# Patient Record
Sex: FEMALE | Race: WHITE | NOT HISPANIC OR LATINO | Employment: OTHER | URBAN - METROPOLITAN AREA
[De-identification: names, ages, dates, MRNs, and addresses within clinical notes are randomized per-mention and may not be internally consistent; named-entity substitution may affect disease eponyms.]

---

## 2017-03-08 ENCOUNTER — OFFICE VISIT (OUTPATIENT)
Dept: MEDICAL GROUP | Facility: PHYSICIAN GROUP | Age: 82
End: 2017-03-08
Payer: COMMERCIAL

## 2017-03-08 VITALS
SYSTOLIC BLOOD PRESSURE: 140 MMHG | HEART RATE: 60 BPM | HEIGHT: 65 IN | OXYGEN SATURATION: 95 % | DIASTOLIC BLOOD PRESSURE: 80 MMHG | TEMPERATURE: 98.2 F | BODY MASS INDEX: 21.83 KG/M2 | RESPIRATION RATE: 16 BRPM | WEIGHT: 131 LBS

## 2017-03-08 DIAGNOSIS — L08.9 INFECTED SEBACEOUS CYST: ICD-10-CM

## 2017-03-08 DIAGNOSIS — I10 ESSENTIAL HYPERTENSION: ICD-10-CM

## 2017-03-08 DIAGNOSIS — L72.3 INFECTED SEBACEOUS CYST: ICD-10-CM

## 2017-03-08 DIAGNOSIS — F41.9 ANXIETY: ICD-10-CM

## 2017-03-08 PROCEDURE — 99214 OFFICE O/P EST MOD 30 MIN: CPT | Performed by: FAMILY MEDICINE

## 2017-03-08 RX ORDER — ALPRAZOLAM 0.25 MG/1
0.25 TABLET ORAL NIGHTLY PRN
Qty: 30 TAB | Refills: 0 | Status: SHIPPED | OUTPATIENT
Start: 2017-03-08 | End: 2019-05-06

## 2017-03-08 RX ORDER — DOXYCYCLINE HYCLATE 100 MG
100 TABLET ORAL 2 TIMES DAILY
Qty: 20 TAB | Refills: 0 | Status: SHIPPED | OUTPATIENT
Start: 2017-03-08 | End: 2019-05-06

## 2017-03-08 ASSESSMENT — ENCOUNTER SYMPTOMS
MYALGIAS: 0
EYES NEGATIVE: 1
HEADACHES: 0
MUSCULOSKELETAL NEGATIVE: 1
DIZZINESS: 0
HEMOPTYSIS: 0
COUGH: 0
CONSTITUTIONAL NEGATIVE: 1
PALPITATIONS: 0
PSYCHIATRIC NEGATIVE: 1
FEVER: 0
NECK PAIN: 0
CONSTIPATION: 0
CHILLS: 0
GASTROINTESTINAL NEGATIVE: 1
RESPIRATORY NEGATIVE: 1
NEUROLOGICAL NEGATIVE: 1
CARDIOVASCULAR NEGATIVE: 1

## 2017-03-08 NOTE — MR AVS SNAPSHOT
"Tamy Calvin   3/8/2017 9:45 AM   Office Visit   MRN: [id number]    Department[facility]  [facility]    Dept Phone:  [phone]    Description:  Female : 1935   Provider:  Baltazar Hopkins M.D.           Reason for Visit     Breast Mass           Allergies as of 3/8/2017     Allergen Noted Reactions    Pcn [Penicillins] 2012         You were diagnosed with     Infected sebaceous cyst   [350191]       Essential hypertension   [9324096]       Anxiety   [252751]         Vital Signs     Blood Pressure Pulse Temperature Respirations Height Weight    140/80 mmHg 60 36.8 °C (98.2 °F) 16 1.651 m (5' 5\") 59.421 kg (131 lb)    Body Mass Index Oxygen Saturation Smoking Status             21.80 kg/m2 95% Never Smoker          Basic Information     Date Of Birth Sex Race Ethnicity Preferred Language    1935 Female White Non- English      Problem List              ICD-10-CM Priority Class Noted - Resolved    Hypertension I10   2012 - Present    Preventative health care Z00.00   2012 - Present    Ischemic heart disease I25.9   Unknown - Present    Multiple thyroid nodules E04.2   Unknown - Present    Osteoporosis M81.0   2012 - Present    Aortic stenosis I35.0   2012 - Present    Seasonal allergies J30.2   2013 - Present    Right foot pain M79.671   2014 - Present    Colon polyps K63.5   2015 - Present      Health Maintenance        Date Due Completion Dates    IMM DTaP/Tdap/Td Vaccine (1 - Tdap) 1954 ---    PAP SMEAR 1956 ---    IMM ZOSTER VACCINE 1995 ---    IMM PNEUMOCOCCAL 65+ (ADULT) LOW/MEDIUM RISK SERIES (1 of 2 - PCV13) 2000 ---    IMM INFLUENZA (1) 2016 ---    BONE DENSITY 7/3/2018 7/3/2013    COLONOSCOPY 2018, 2015, 2014            Current Immunizations     No immunizations on file.      Below and/or attached are the medications your provider expects you to take. Review all of your home medications " and newly ordered medications with your provider and/or pharmacist. Follow medication instructions as directed by your provider and/or pharmacist. Please keep your medication list with you and share with your provider. Update the information when medications are discontinued, doses are changed, or new medications (including over-the-counter products) are added; and carry medication information at all times in the event of emergency situations     Allergies:  PCN - (reactions not documented)               Medications  Valid as of: March 08, 2017 - 10:10 AM    Generic Name Brand Name Tablet Size Instructions for use    ALPRAZolam (Tab) XANAX 0.25 MG Take 1 Tab by mouth at bedtime as needed. May need  For anxiety        AmLODIPine Besylate (Tab) NORVASC 2.5 MG TAKE ONE TABLET BY MOUTH ONCE DAILY        Aspirin (Tablet Delayed Response) ECOTRIN 81 MG Take 81 mg by mouth every day.        Carvedilol (Tab) COREG 6.25 MG TAKE ONE TABLET BY MOUTH TWICE DAILY WITH MEALS        Carvedilol (Tab) COREG 6.25 MG TAKE ONE TABLET BY MOUTH TWICE DAILY WITH MEALS        Citalopram Hydrobromide (Tab) CELEXA 20 MG Take 1 Tab by mouth every day.        Diclofenac Sodium (Gel) Diclofenac Sodium 1 % Apply 1 Application to skin as directed 3 times a day.        Doxycycline Hyclate (Tab) VIBRAMYCIN 100 MG Take 1 Tab by mouth 2 times a day.        Lisinopril-Hydrochlorothiazide (Tab) PRINZIDE, ZESTORETIC 20-12.5 MG Take 1 Tab by mouth every day.        Loratadine (Chew Tab) Loratadine 5 MG Take  by mouth.        Meloxicam (Tab) MOBIC 7.5 MG Take 1 Tab by mouth every day.        MethylPREDNISolone (Tablet Therapy Pack) MEDROL DOSEPAK 4 MG As dir        .                 Medicines prescribed today were sent to:     [facility] [address] [place] [address] (S), [place] [id number] [facility] (S) [place] [address]    Phone: [phone] Fax: [phone]    Open 24 Hours?: No      Medication refill instructions:       If your prescription bottle  indicates you have medication refills left, it is not necessary to call your provider’s office. Please contact your pharmacy and they will refill your medication.    If your prescription bottle indicates you do not have any refills left, you may request refills at any time through one of the following ways: The online [employer] system (except Urgent Care), by calling your provider’s office, or by asking your pharmacy to contact your provider’s office with a refill request. Medication refills are processed only during regular business hours and may not be available until the next business day. Your provider may request additional information or to have a follow-up visit with you prior to refilling your medication.   *Please Note: Medication refills are assigned a new Rx number when refilled electronically. Your pharmacy may indicate that no refills were authorized even though a new prescription for the same medication is available at the pharmacy. Please request the medicine by name with the pharmacy before contacting your provider for a refill.           [employer] Status: Patient Declined

## 2017-03-08 NOTE — PROGRESS NOTES
Subjective:      Tamy Calvin is a 81 y.o. female who presents with Breast Mass            HPI Comments: Had a left upper chest mass for many years - got inflamed and then burst and still is a bit red    1. Infected sebaceous cyst    - doxycycline (VIBRAMYCIN) 100 MG Tab; Take 1 Tab by mouth 2 times a day.  Dispense: 20 Tab; Refill: 0    Past Medical History:    Hypertension                                                  Ischemic heart disease                                        Arrhythmia                                                      Comment:unknown type.  pt is on sotalol    Osteoporosis                                                  Multiple thyroid nodules                                        Comment:reportedly had thyroid us in [place] 4/12 that                was stable.     Cataract                                                      Heart murmur                                                  Bilateral cataracts                             6/25/2012   Past Surgical History:    ABDOMINAL HYSTERECTOMY TOTAL                                   Smoking Status: Never Smoker                      Smokeless Status: Never Used                        Alcohol Use: No              Review of patient's family history indicates:    Cancer                         Daughter                    Comment: breast cancer     Psychiatry                     Daughter                  Genetic                        Maternal Grandmother        Current outpatient prescriptions: •  doxycycline (VIBRAMYCIN) 100 MG Tab, Take 1 Tab by mouth 2 times a day., Disp: 20 Tab, Rfl: 0•  carvedilol (COREG) 6.25 MG Tab, TAKE ONE TABLET BY MOUTH TWICE DAILY WITH MEALS, Disp: 60 Tab, Rfl: 11•  amlodipine (NORVASC) 2.5 MG Tab, TAKE ONE TABLET BY MOUTH ONCE DAILY, Disp: 90 Tab, Rfl: 3•  meloxicam (MOBIC) 7.5 MG Tab, Take 1 Tab by mouth every day., Disp: 30 Tab, Rfl: 3•  MethylPREDNISolone (MEDROL DOSEPAK) 4 MG Tablet Therapy Pack,  As dir, Disp: 1 Kit, Rfl: 0•  citalopram (CELEXA) 20 MG Tab, Take 1 Tab by mouth every day., Disp: 30 Tab, Rfl: 3•  lisinopril-hydrochlorothiazide (PRINZIDE, ZESTORETIC) 20-12.5 MG per tablet, Take 1 Tab by mouth every day., Disp: 90 Tab, Rfl: 3•  carvedilol (COREG) 6.25 MG Tab, TAKE ONE TABLET BY MOUTH TWICE DAILY WITH MEALS, Disp: 180 Tab, Rfl: 1•  Loratadine (CLARITIN) 5 MG Chew Tab, Take  by mouth., Disp: , Rfl: •  alprazolam (XANAX) 0.25 MG Tab, Take 1 Tab by mouth at bedtime as needed for Sleep. May need  For anxiety (Patient not taking: Reported on 10/31/2016), Disp: 30 Tab, Rfl: 0•  Diclofenac Sodium 1 % GEL, Apply 1 Application to skin as directed 3 times a day., Disp: 45 g, Rfl: 1•  aspirin EC (ECOTRIN) 81 MG TBEC, Take 81 mg by mouth every day., Disp: , Rfl:      Assessment/plan: diagnoses listed as above in problem list. Patient will continue with current medications/diet/lifestyle modifications    Patient will continue with current medication regimen, advised on taking meds every day, f/u in 3 mo.          Rash  This is a new problem. The current episode started more than 1 year ago. The affected locations include the chest. Pertinent negatives include no cough or fever.       Review of Systems   Constitutional: Negative.  Negative for fever and chills.        Past Medical History:    Hypertension                                                  Ischemic heart disease                                        Arrhythmia                                                      Comment:unknown type.  pt is on sotalol    Osteoporosis                                                  Multiple thyroid nodules                                        Comment:reportedly had thyroid us in [place] 4/12 that                was stable.     Cataract                                                      Heart murmur                                                  Bilateral cataracts                             6/25/2012   Past  "Surgical History:    ABDOMINAL HYSTERECTOMY TOTAL                                   Smoking Status: Never Smoker                      Smokeless Status: Never Used                        Alcohol Use: No              Review of patient's family history indicates:    Cancer                         Daughter                    Comment: breast cancer     Psychiatry                     Daughter                  Genetic                        Maternal Grandmother       HENT: Negative.    Eyes: Negative.    Respiratory: Negative.  Negative for cough and hemoptysis.    Cardiovascular: Negative.  Negative for chest pain and palpitations.   Gastrointestinal: Negative.  Negative for constipation.   Genitourinary: Negative.  Negative for dysuria and urgency.   Musculoskeletal: Negative.  Negative for myalgias and neck pain.   Skin: Positive for rash.   Neurological: Negative.  Negative for dizziness and headaches.   Endo/Heme/Allergies: Negative.    Psychiatric/Behavioral: Negative.  Negative for suicidal ideas.          Objective:     /80 mmHg  Pulse 60  Temp(Src) 36.8 °C (98.2 °F)  Resp 16  Ht 1.651 m (5' 5\")  Wt 59.421 kg (131 lb)  BMI 21.80 kg/m2  SpO2 95%     Physical Exam   Constitutional: She is oriented to person, place, and time. No distress.   HENT:   Head: Normocephalic and atraumatic.   Right Ear: External ear normal.   Left Ear: External ear normal.   Nose: Nose normal.   Mouth/Throat: Oropharynx is clear and moist. No oropharyngeal exudate.   Eyes: Pupils are equal, round, and reactive to light. Right eye exhibits no discharge. Left eye exhibits no discharge. No scleral icterus.   Neck: Normal range of motion. Neck supple. No JVD present. No tracheal deviation present. No thyromegaly present.   Cardiovascular: Normal rate, regular rhythm, normal heart sounds and intact distal pulses.  Exam reveals no gallop and no friction rub.    No murmur heard.  Pulmonary/Chest: Effort normal and breath sounds normal. " No stridor. No respiratory distress. She has no wheezes. She has no rales. She exhibits no tenderness.   Abdominal: Soft. She exhibits no distension. There is no tenderness.   Lymphadenopathy:     She has no cervical adenopathy.   Neurological: She is alert and oriented to person, place, and time.   Skin: Skin is warm and dry. She is not diaphoretic.   Infected sebaceous cyst on left upper chest - will treat with abx and then f/u in 2 weeks for removal   Psychiatric: Judgment normal.   Nursing note and vitals reviewed.              Assessment/Plan:     1. Infected sebaceous cyst    - doxycycline (VIBRAMYCIN) 100 MG Tab; Take 1 Tab by mouth 2 times a day.  Dispense: 20 Tab; Refill: 0

## 2017-03-22 ENCOUNTER — OFFICE VISIT (OUTPATIENT)
Dept: MEDICAL GROUP | Facility: PHYSICIAN GROUP | Age: 82
End: 2017-03-22
Payer: COMMERCIAL

## 2017-03-22 VITALS
SYSTOLIC BLOOD PRESSURE: 110 MMHG | BODY MASS INDEX: 21.83 KG/M2 | HEIGHT: 65 IN | WEIGHT: 131 LBS | RESPIRATION RATE: 16 BRPM | OXYGEN SATURATION: 95 % | TEMPERATURE: 96.8 F | DIASTOLIC BLOOD PRESSURE: 62 MMHG | HEART RATE: 62 BPM

## 2017-03-22 DIAGNOSIS — L72.3 SEBACEOUS CYST: ICD-10-CM

## 2017-03-22 DIAGNOSIS — Z98.890 H/O REMOVAL OF CYST: ICD-10-CM

## 2017-03-22 PROCEDURE — 11401 EXC TR-EXT B9+MARG 0.6-1 CM: CPT | Performed by: FAMILY MEDICINE

## 2017-03-22 NOTE — PROGRESS NOTES
After consent obtained, area prepped with iodine and then anesth with 1% lidocaine  #15 blade used to make 7 mm incision and then 10 by 5 mm sebaceous cyst removed along with sac pieces. Irrigated and closed with one 4-0 nylon suture , bandage applied and advised on wound care and f/u in one week for suture removal    1. H/O removal of cyst    - Consent for Surgery / Procedure    2. Sebaceous cyst      Past Medical History   Diagnosis Date   • Hypertension    • Ischemic heart disease    • Arrhythmia      unknown type.  pt is on sotalol   • Osteoporosis    • Multiple thyroid nodules      reportedly had thyroid us in [place] 4/12 that was stable.    • Cataract    • Heart murmur    • Bilateral cataracts 6/25/2012     Past Surgical History   Procedure Laterality Date   • Abdominal hysterectomy total       Social History   Substance Use Topics   • Smoking status: Never Smoker    • Smokeless tobacco: Never Used   • Alcohol Use: No     Family History   Problem Relation Age of Onset   • Cancer Daughter      breast cancer    • Psychiatry Daughter    • Genetic Maternal Grandmother          Current outpatient prescriptions:   •  doxycycline (VIBRAMYCIN) 100 MG Tab, Take 1 Tab by mouth 2 times a day., Disp: 20 Tab, Rfl: 0  •  alprazolam (XANAX) 0.25 MG Tab, Take 1 Tab by mouth at bedtime as needed. May need  For anxiety, Disp: 30 Tab, Rfl: 0  •  carvedilol (COREG) 6.25 MG Tab, TAKE ONE TABLET BY MOUTH TWICE DAILY WITH MEALS, Disp: 60 Tab, Rfl: 11  •  amlodipine (NORVASC) 2.5 MG Tab, TAKE ONE TABLET BY MOUTH ONCE DAILY, Disp: 90 Tab, Rfl: 3  •  meloxicam (MOBIC) 7.5 MG Tab, Take 1 Tab by mouth every day., Disp: 30 Tab, Rfl: 3  •  MethylPREDNISolone (MEDROL DOSEPAK) 4 MG Tablet Therapy Pack, As dir, Disp: 1 Kit, Rfl: 0  •  citalopram (CELEXA) 20 MG Tab, Take 1 Tab by mouth every day., Disp: 30 Tab, Rfl: 3  •  lisinopril-hydrochlorothiazide (PRINZIDE, ZESTORETIC) 20-12.5 MG per tablet, Take 1 Tab by mouth every day., Disp: 90 Tab,  Rfl: 3  •  carvedilol (COREG) 6.25 MG Tab, TAKE ONE TABLET BY MOUTH TWICE DAILY WITH MEALS, Disp: 180 Tab, Rfl: 1  •  Loratadine (CLARITIN) 5 MG Chew Tab, Take  by mouth., Disp: , Rfl:   •  Diclofenac Sodium 1 % GEL, Apply 1 Application to skin as directed 3 times a day., Disp: 45 g, Rfl: 1  •  aspirin EC (ECOTRIN) 81 MG TBEC, Take 81 mg by mouth every day., Disp: , Rfl:     Assessment/plan: diagnoses listed as above in problem list. Patient will continue with current medications/diet/lifestyle modifications    Patient will continue with current medication regimen, advised on taking meds every day, f/u in 3 mo.

## 2017-03-22 NOTE — TELEPHONE ENCOUNTER
Was the patient seen in the last year in this department? Yes     Does patient have an active prescription for medications requested? No     Received Request Via: Pharmacy   Last seen 3/22/17  Last labs 11/18/16

## 2017-03-23 RX ORDER — MELOXICAM 7.5 MG/1
TABLET ORAL
Qty: 30 TAB | Refills: 2 | Status: SHIPPED | OUTPATIENT
Start: 2017-03-23 | End: 2017-07-05 | Stop reason: SDUPTHER

## 2017-04-06 ENCOUNTER — OFFICE VISIT (OUTPATIENT)
Dept: MEDICAL GROUP | Facility: PHYSICIAN GROUP | Age: 82
End: 2017-04-06
Payer: COMMERCIAL

## 2017-04-06 VITALS
RESPIRATION RATE: 16 BRPM | HEIGHT: 65 IN | HEART RATE: 66 BPM | WEIGHT: 131 LBS | TEMPERATURE: 98.3 F | OXYGEN SATURATION: 95 % | BODY MASS INDEX: 21.83 KG/M2 | DIASTOLIC BLOOD PRESSURE: 80 MMHG | SYSTOLIC BLOOD PRESSURE: 148 MMHG

## 2017-04-06 DIAGNOSIS — L72.3 SEBACEOUS CYST: ICD-10-CM

## 2017-04-06 NOTE — MR AVS SNAPSHOT
"        Liliachnadan[facility] Espinalmira   2017 1:15 PM   Office Visit   MRN: [id number]    [facility]    Dept Phone:  [phone]    Description:  Female : 1935   Provider:  Baltazar Hopkins M.D.           Reason for Visit     Suture / Staple Removal           Allergies as of 2017     Allergen Noted Reactions    Pcn [Penicillins] 2012         You were diagnosed with     Sebaceous cyst   [706.2.ICD-9-CM]         Vital Signs     Blood Pressure Pulse Temperature Respirations Height Weight    148/80 mmHg 66 36.8 °C (98.3 °F) 16 1.651 m (5' 5\") 59.421 kg (131 lb)    Body Mass Index Oxygen Saturation Smoking Status             21.80 kg/m2 95% Never Smoker          Basic Information     Date Of Birth Sex Race Ethnicity Preferred Language    1935 Female White Non- English      Problem List              ICD-10-CM Priority Class Noted - Resolved    Hypertension I10   2012 - Present    Preventative health care Z00.00   2012 - Present    Ischemic heart disease I25.9   Unknown - Present    Multiple thyroid nodules E04.2   Unknown - Present    Osteoporosis M81.0   2012 - Present    Aortic stenosis I35.0   2012 - Present    Seasonal allergies J30.2   2013 - Present    Right foot pain M79.671   2014 - Present    Colon polyps K63.5   2015 - Present      Health Maintenance        Date Due Completion Dates    IMM DTaP/Tdap/Td Vaccine (1 - Tdap) 1954 ---    PAP SMEAR 1956 ---    IMM ZOSTER VACCINE 1995 ---    IMM PNEUMOCOCCAL 65+ (ADULT) LOW/MEDIUM RISK SERIES (1 of 2 - PCV13) 2000 ---    BONE DENSITY 7/3/2018 7/3/2013    COLONOSCOPY 2018, 2015, 2014            Current Immunizations     No immunizations on file.      Below and/or attached are the medications your provider expects you to take. Review all of your home medications and newly ordered medications with your provider and/or pharmacist. Follow medication " instructions as directed by your provider and/or pharmacist. Please keep your medication list with you and share with your provider. Update the information when medications are discontinued, doses are changed, or new medications (including over-the-counter products) are added; and carry medication information at all times in the event of emergency situations     Allergies:  PCN - (reactions not documented)               Medications  Valid as of: April 06, 2017 -  3:55 PM    Generic Name Brand Name Tablet Size Instructions for use    ALPRAZolam (Tab) XANAX 0.25 MG Take 1 Tab by mouth at bedtime as needed. May need  For anxiety        AmLODIPine Besylate (Tab) NORVASC 2.5 MG TAKE ONE TABLET BY MOUTH ONCE DAILY        Aspirin (Tablet Delayed Response) ECOTRIN 81 MG Take 81 mg by mouth every day.        Carvedilol (Tab) COREG 6.25 MG TAKE ONE TABLET BY MOUTH TWICE DAILY WITH MEALS        Carvedilol (Tab) COREG 6.25 MG TAKE ONE TABLET BY MOUTH TWICE DAILY WITH MEALS        Citalopram Hydrobromide (Tab) CELEXA 20 MG Take 1 Tab by mouth every day.        Diclofenac Sodium (Gel) Diclofenac Sodium 1 % Apply 1 Application to skin as directed 3 times a day.        Doxycycline Hyclate (Tab) VIBRAMYCIN 100 MG Take 1 Tab by mouth 2 times a day.        Lisinopril-Hydrochlorothiazide (Tab) PRINZIDE, ZESTORETIC 20-12.5 MG Take 1 Tab by mouth every day.        Loratadine (Chew Tab) Loratadine 5 MG Take  by mouth.        Meloxicam (Tab) MOBIC 7.5 MG TAKE ONE TABLET BY MOUTH ONCE DAILY        MethylPREDNISolone (Tablet Therapy Pack) MEDROL DOSEPAK 4 MG As dir        .                 Medicines prescribed today were sent to:     [facility] [place] ([place]), [place] - [address] [id number] [facility] [place] ([place]) [place] [address]    Phone: [phone] Fax: [phone]    Open 24 Hours?: No      Medication refill instructions:       If your prescription bottle indicates you have medication refills left, it is not necessary to call your  provider’s office. Please contact your pharmacy and they will refill your medication.    If your prescription bottle indicates you do not have any refills left, you may request refills at any time through one of the following ways: The online [employer] system (except Urgent Care), by calling your provider’s office, or by asking your pharmacy to contact your provider’s office with a refill request. Medication refills are processed only during regular business hours and may not be available until the next business day. Your provider may request additional information or to have a follow-up visit with you prior to refilling your medication.   *Please Note: Medication refills are assigned a new Rx number when refilled electronically. Your pharmacy may indicate that no refills were authorized even though a new prescription for the same medication is available at the pharmacy. Please request the medicine by name with the pharmacy before contacting your provider for a refill.           MyChart Status: Patient Declined

## 2017-06-05 ENCOUNTER — OFFICE VISIT (OUTPATIENT)
Dept: MEDICAL GROUP | Facility: LAB | Age: 82
End: 2017-06-05
Payer: COMMERCIAL

## 2017-06-05 VITALS
WEIGHT: 127 LBS | SYSTOLIC BLOOD PRESSURE: 126 MMHG | RESPIRATION RATE: 12 BRPM | HEART RATE: 56 BPM | OXYGEN SATURATION: 96 % | HEIGHT: 65 IN | BODY MASS INDEX: 21.16 KG/M2 | DIASTOLIC BLOOD PRESSURE: 84 MMHG | TEMPERATURE: 99.1 F

## 2017-06-05 DIAGNOSIS — R21 RASH AND NONSPECIFIC SKIN ERUPTION: ICD-10-CM

## 2017-06-05 DIAGNOSIS — I83.93 VARICOSE VEINS OF LEGS: ICD-10-CM

## 2017-06-05 DIAGNOSIS — J30.2 SEASONAL ALLERGIC RHINITIS, UNSPECIFIED ALLERGIC RHINITIS TRIGGER: ICD-10-CM

## 2017-06-05 PROCEDURE — 99214 OFFICE O/P EST MOD 30 MIN: CPT | Performed by: FAMILY MEDICINE

## 2017-06-05 ASSESSMENT — ENCOUNTER SYMPTOMS
SHORTNESS OF BREATH: 0
FEVER: 0
RHINORRHEA: 1

## 2017-06-05 NOTE — MR AVS SNAPSHOT
"        Liliayolandaalexander Espinalmira   2017 9:20 AM   Office Visit   MRN: [id number]    Department:  [facility]   Dept Phone:  [phone]    Description:  Female : 1935   Provider:  Damaris Faustin M.D.           Reason for Visit     Rash on both legs x 2-3 weeks, very itchy      Allergies as of 2017     Allergen Noted Reactions    Pcn [Penicillins] 2012         You were diagnosed with     Seasonal allergic rhinitis, unspecified allergic rhinitis trigger   [9490468]       Rash and nonspecific skin eruption   [[id number]]       Varicose veins of legs   [[id number]]         Vital Signs     Blood Pressure Pulse Temperature Respirations Height Weight    126/84 mmHg 56 37.3 °C (99.1 °F) 12 1.651 m (5' 5\") 57.607 kg (127 lb)    Body Mass Index Oxygen Saturation Smoking Status             21.13 kg/m2 96% Never Smoker          Basic Information     Date Of Birth Sex Race Ethnicity Preferred Language    1935 Female White Non- English      Problem List              ICD-10-CM Priority Class Noted - Resolved    Hypertension I10   2012 - Present    Preventative health care Z00.00   2012 - Present    Ischemic heart disease I25.9   Unknown - Present    Multiple thyroid nodules E04.2   Unknown - Present    Osteoporosis M81.0   2012 - Present    Aortic stenosis I35.0   2012 - Present    Seasonal allergies J30.2   2013 - Present    Right foot pain M79.671   2014 - Present    Colon polyps K63.5   2015 - Present      Health Maintenance        Date Due Completion Dates    IMM DTaP/Tdap/Td Vaccine (1 - Tdap) 1954 ---    PAP SMEAR 1956 ---    IMM ZOSTER VACCINE 1995 ---    IMM PNEUMOCOCCAL 65+ (ADULT) LOW/MEDIUM RISK SERIES (1 of 2 - PCV13) 2000 ---    BONE DENSITY 7/3/2018 7/3/2013    COLONOSCOPY 2018, 2015, 2014            Current Immunizations     No immunizations on file.      Below and/or attached are the medications your " provider expects you to take. Review all of your home medications and newly ordered medications with your provider and/or pharmacist. Follow medication instructions as directed by your provider and/or pharmacist. Please keep your medication list with you and share with your provider. Update the information when medications are discontinued, doses are changed, or new medications (including over-the-counter products) are added; and carry medication information at all times in the event of emergency situations     Allergies:  PCN - (reactions not documented)               Medications  Valid as of: June 05, 2017 - 10:02 AM    Generic Name Brand Name Tablet Size Instructions for use    ALPRAZolam (Tab) XANAX 0.25 MG Take 1 Tab by mouth at bedtime as needed. May need  For anxiety        AmLODIPine Besylate (Tab) NORVASC 2.5 MG TAKE ONE TABLET BY MOUTH ONCE DAILY        Aspirin (Tablet Delayed Response) ECOTRIN 81 MG Take 81 mg by mouth every day.        Carvedilol (Tab) COREG 6.25 MG TAKE ONE TABLET BY MOUTH TWICE DAILY WITH MEALS        Carvedilol (Tab) COREG 6.25 MG TAKE ONE TABLET BY MOUTH TWICE DAILY WITH MEALS        Citalopram Hydrobromide (Tab) CELEXA 20 MG Take 1 Tab by mouth every day.        Diclofenac Sodium (Gel) Diclofenac Sodium 1 % Apply 1 Application to skin as directed 3 times a day.        Doxycycline Hyclate (Tab) VIBRAMYCIN 100 MG Take 1 Tab by mouth 2 times a day.        Hydrocortisone (Cream) hydrocortisone 2.5 % Apply sparingly to affected area bid        Lisinopril-Hydrochlorothiazide (Tab) PRINZIDE, ZESTORETIC 20-12.5 MG Take 1 Tab by mouth every day.        Loratadine (Chew Tab) Loratadine 5 MG Take  by mouth.        Meloxicam (Tab) MOBIC 7.5 MG TAKE ONE TABLET BY MOUTH ONCE DAILY        MethylPREDNISolone (Tablet Therapy Pack) MEDROL DOSEPAK 4 MG As dir        .                 Medicines prescribed today were sent to:     [facility] [address] [place] - [address] [address] [facility]  [place] [id number]    Phone: [phone] Fax: [phone]    Open 24 Hours?: No      Medication refill instructions:       If your prescription bottle indicates you have medication refills left, it is not necessary to call your provider’s office. Please contact your pharmacy and they will refill your medication.    If your prescription bottle indicates you do not have any refills left, you may request refills at any time through one of the following ways: The online [employer] [place] system (except Urgent Care), by calling your provider’s office, or by asking your pharmacy to contact your provider’s office with a refill request. Medication refills are processed only during regular business hours and may not be available until the next business day. Your provider may request additional information or to have a follow-up visit with you prior to refilling your medication.   *Please Note: Medication refills are assigned a new Rx number when refilled electronically. Your pharmacy may indicate that no refills were authorized even though a new prescription for the same medication is available at the pharmacy. Please request the medicine by name with the pharmacy before contacting your provider for a refill.        Referral     A referral request has been sent to our patient care coordination department. Please allow 3-5 business days for us to process this request and contact you either by phone or mail. If you do not hear from us by the 5th business day, please call us at [phone].           [employer] [place] Access Code: [email or web address]  Expires: 7/5/2017 10:02 AM    [employer] [place]  A secure, online tool to manage your health information     [employer]’s [employer] [place]® is a secure, online tool that connects you to your personalized health information from the privacy of your home -- day or night - making it very easy for you to manage your healthcare. Once the activation process is completed, you can even access your medical information  using the [facility] andrew, which is available for free in the Apple Andrew store or Google Play store.     [facility] provides the following levels of access (as shown below):   My Chart Features   [facility] Primary [facility] Doctor [facility]  Specialists [facility]  Urgent  Care Non-[facility]  Primary Care  Doctor   Email your healthcare team securely and privately 24/7 X X X    Manage appointments: schedule your next appointment; view details of past/upcoming appointments X      Request prescription refills. X      View recent personal medical records, including lab and immunizations X X X X   View health record, including health history, allergies, medications X X X X   Read reports about your outpatient visits, procedures, consult and ER notes X X X X   See your discharge summary, which is a recap of your hospital and/or ER visit that includes your diagnosis, lab results, and care plan. X X       How to register for [facility]:  1. Go to  [email or web address].  2. Click on the Sign Up Now box, which takes you to the New Member Sign Up page. You will need to provide the following information:  a. Enter your [facility] Access Code exactly as it appears at the top of this page. (You will not need to use this code after you’ve completed the sign-up process. If you do not sign up before the expiration date, you must request a new code.)   b. Enter your date of birth.   c. Enter your home email address.   d. Click Submit, and follow the next screen’s instructions.  3. Create a [facility] ID. This will be your [facility] login ID and cannot be changed, so think of one that is secure and easy to remember.  4. Create a [facility] password. You can change your password at any time.  5. Enter your Password Reset Question and Answer. This can be used at a later time if you forget your password.   6. Enter your e-mail address. This allows you to receive e-mail notifications when new information is available in [facility].  7. Click Sign Up. You can now view your  health information.    For assistance activating your [employer] account, call [phone]

## 2017-06-05 NOTE — PROGRESS NOTES
Subjective:      Tamy Calvin is a 81 y.o. female who presents with Rash    Chief Complaint   Patient presents with   • Rash     on both legs x 2-3 weeks, very itchy     Daughter interpreting for patient         Rash  This is a new problem. The problem has been gradually improving since onset. The affected locations include the left lower leg and right lowerleg (both anterior legs ). The rash is characterized by dryness and itchiness. She was exposed to nothing. Associated symptoms include rhinorrhea. Pertinent negatives include no fever or shortness of breath. Past treatments include antihistamine and anti-itch cream (topical and oral antihistamine). The treatment provided mild relief. Her past medical history is significant for allergies.     Rash on legs. Patient has had this on and off now for about 5 years and has been able to manage this with hydrocortisone OTC cream and benadryl cream. Has been doing this lately and not helping. She has had a rash now for 2 weeks. Daughter reports that the area is red and itchy. Taking allegra daily and was taking Claritin before that. For the past couple of days she has been taking Benadryl and this seems to help a little bit with the rash as the rash is not as red. Topical aloe vera also seems to be helping. Has allergies and has sneexing, runny nose. They are not sure what the trigger is. Patient thinks she is reacting to the weather but daughter thinks this is some allergy. Took allegra last this morning. Rash is only on the anterior lower legs and she can tell that she wears socks as the rash does not go down onto her feet. No rash located on her posterior lower legs. She does go for walks and has been wearing long pants for the most part but sometimes will wear joan's.     Patient Active Problem List    Diagnosis Date Noted   • Colon polyps 01/29/2015   • Right foot pain 08/25/2014   • Seasonal allergies 06/11/2013   • Aortic stenosis 08/07/2012   •  Hypertension 06/25/2012   • Preventative health care 06/25/2012   • Osteoporosis 06/25/2012   • Ischemic heart disease    • Multiple thyroid nodules      Family History   Problem Relation Age of Onset   • Cancer Daughter      breast cancer    • Psychiatry Daughter    • Genetic Maternal Grandmother      Social History     Social History   • Marital Status:      Spouse Name: N/A   • Number of Children: N/A   • Years of Education: N/A     Occupational History   • Not on file.     Social History Main Topics   • Smoking status: Never Smoker    • Smokeless tobacco: Never Used   • Alcohol Use: No   • Drug Use: No   • Sexual Activity: Not Currently     Other Topics Concern   • Not on file     Social History Narrative       Current outpatient prescriptions:   •  meloxicam (MOBIC) 7.5 MG Tab, TAKE ONE TABLET BY MOUTH ONCE DAILY, Disp: 30 Tab, Rfl: 2  •  doxycycline (VIBRAMYCIN) 100 MG Tab, Take 1 Tab by mouth 2 times a day., Disp: 20 Tab, Rfl: 0  •  alprazolam (XANAX) 0.25 MG Tab, Take 1 Tab by mouth at bedtime as needed. May need  For anxiety, Disp: 30 Tab, Rfl: 0  •  carvedilol (COREG) 6.25 MG Tab, TAKE ONE TABLET BY MOUTH TWICE DAILY WITH MEALS, Disp: 60 Tab, Rfl: 11  •  amlodipine (NORVASC) 2.5 MG Tab, TAKE ONE TABLET BY MOUTH ONCE DAILY, Disp: 90 Tab, Rfl: 3  •  MethylPREDNISolone (MEDROL DOSEPAK) 4 MG Tablet Therapy Pack, As dir, Disp: 1 Kit, Rfl: 0  •  citalopram (CELEXA) 20 MG Tab, Take 1 Tab by mouth every day., Disp: 30 Tab, Rfl: 3  •  lisinopril-hydrochlorothiazide (PRINZIDE, ZESTORETIC) 20-12.5 MG per tablet, Take 1 Tab by mouth every day., Disp: 90 Tab, Rfl: 3  •  carvedilol (COREG) 6.25 MG Tab, TAKE ONE TABLET BY MOUTH TWICE DAILY WITH MEALS, Disp: 180 Tab, Rfl: 1  •  Loratadine (CLARITIN) 5 MG Chew Tab, Take  by mouth., Disp: , Rfl:   •  Diclofenac Sodium 1 % GEL, Apply 1 Application to skin as directed 3 times a day., Disp: 45 g, Rfl: 1  •  aspirin EC (ECOTRIN) 81 MG TBEC, Take 81 mg by mouth every  "day., Disp: , Rfl:       Review of Systems   Constitutional: Negative for fever.   HENT: Positive for rhinorrhea.    Respiratory: Negative for shortness of breath.    Skin: Positive for rash.          Objective:     /84 mmHg  Pulse 56  Temp(Src) 37.3 °C (99.1 °F)  Resp 12  Ht 1.651 m (5' 5\")  Wt 57.607 kg (127 lb)  BMI 21.13 kg/m2  SpO2 96%     Physical Exam   Constitutional: She appears well-developed and well-nourished. She is active and cooperative.  Non-toxic appearance. She does not have a sickly appearance. No distress.   HENT:   Head: Normocephalic and atraumatic.   Eyes: Conjunctivae and EOM are normal.   Neurological: She is alert. She is not disoriented. She displays no tremor. She displays no seizure activity. Gait normal.   Skin: Skin is warm and dry. She is not diaphoretic.        Area of rash. No excoriation noted . Varicosities noted in both her lower extremities   Psychiatric: Her speech is normal.               Assessment/Plan:     1. Seasonal allergic rhinitis, unspecified allergic rhinitis trigger  Referral to allergist done  - REFERRAL TO ALLERGY    2. Rash and nonspecific skin eruption  - hydrocortisone 2.5 % Cream topical cream; Apply sparingly to affected area bid  Dispense: 1 Tube; Refill: 0    3. Varicose veins of legs    Discussed with patient and daughter that this is likely something that patient is coming into contact with. This is improving with over-the-counter Benadryl and patient is having no side effects with the Benadryl. We discussed using this medication cautiously. We discussed topical versus oral steroid. She was given a prescription for hydrocortisone to apply sparingly to the affected area only.  Also discussed that this could be a component of venous stasis dermatitis.       Please note that this dictation was created using voice recognition software. I have made every reasonable attempt to correct obvious errors, but I expect that there are errors of grammar " and possibly content that I did not discover before finalizing the note.

## 2017-06-06 DIAGNOSIS — I10 ESSENTIAL HYPERTENSION: ICD-10-CM

## 2017-06-06 RX ORDER — LISINOPRIL AND HYDROCHLOROTHIAZIDE 20; 12.5 MG/1; MG/1
TABLET ORAL
Qty: 90 TAB | Refills: 1 | Status: SHIPPED | OUTPATIENT
Start: 2017-06-06 | End: 2017-06-08 | Stop reason: SDUPTHER

## 2017-06-08 DIAGNOSIS — I10 ESSENTIAL HYPERTENSION: ICD-10-CM

## 2017-06-08 RX ORDER — LISINOPRIL AND HYDROCHLOROTHIAZIDE 20; 12.5 MG/1; MG/1
1 TABLET ORAL DAILY
Qty: 90 TAB | Refills: 1 | Status: SHIPPED | OUTPATIENT
Start: 2017-06-08 | End: 2017-12-02 | Stop reason: SDUPTHER

## 2017-07-05 RX ORDER — MELOXICAM 7.5 MG/1
TABLET ORAL
Qty: 30 TAB | Refills: 2 | Status: SHIPPED | OUTPATIENT
Start: 2017-07-05 | End: 2017-10-02 | Stop reason: SDUPTHER

## 2017-08-11 DIAGNOSIS — I25.9 ISCHEMIC HEART DISEASE: ICD-10-CM

## 2017-08-11 DIAGNOSIS — I35.0 NONRHEUMATIC AORTIC VALVE STENOSIS: ICD-10-CM

## 2017-08-11 DIAGNOSIS — Z00.00 ENCOUNTER FOR SCREENING AND PREVENTATIVE CARE: ICD-10-CM

## 2017-09-08 ENCOUNTER — HOSPITAL ENCOUNTER (OUTPATIENT)
Dept: CARDIOLOGY | Facility: MEDICAL CENTER | Age: 82
End: 2017-09-08
Attending: INTERNAL MEDICINE
Payer: COMMERCIAL

## 2017-09-08 ENCOUNTER — HOSPITAL ENCOUNTER (OUTPATIENT)
Dept: LAB | Facility: MEDICAL CENTER | Age: 82
End: 2017-09-08
Attending: INTERNAL MEDICINE
Payer: COMMERCIAL

## 2017-09-08 DIAGNOSIS — I25.9 ISCHEMIC HEART DISEASE: ICD-10-CM

## 2017-09-08 DIAGNOSIS — I35.0 NONRHEUMATIC AORTIC VALVE STENOSIS: ICD-10-CM

## 2017-09-08 DIAGNOSIS — Z00.00 ENCOUNTER FOR SCREENING AND PREVENTATIVE CARE: ICD-10-CM

## 2017-09-08 LAB
ALBUMIN SERPL BCP-MCNC: 4 G/DL (ref 3.2–4.9)
ALBUMIN/GLOB SERPL: 1.1 G/DL
ALP SERPL-CCNC: 55 U/L (ref 30–99)
ALT SERPL-CCNC: 14 U/L (ref 2–50)
ANION GAP SERPL CALC-SCNC: 7 MMOL/L (ref 0–11.9)
AST SERPL-CCNC: 19 U/L (ref 12–45)
BILIRUB SERPL-MCNC: 0.8 MG/DL (ref 0.1–1.5)
BUN SERPL-MCNC: 18 MG/DL (ref 8–22)
CALCIUM SERPL-MCNC: 9.7 MG/DL (ref 8.5–10.5)
CHLORIDE SERPL-SCNC: 100 MMOL/L (ref 96–112)
CHOLEST SERPL-MCNC: 205 MG/DL (ref 100–199)
CO2 SERPL-SCNC: 28 MMOL/L (ref 20–33)
CREAT SERPL-MCNC: 0.82 MG/DL (ref 0.5–1.4)
GFR SERPL CREATININE-BSD FRML MDRD: >60 ML/MIN/1.73 M 2
GLOBULIN SER CALC-MCNC: 3.7 G/DL (ref 1.9–3.5)
GLUCOSE SERPL-MCNC: 94 MG/DL (ref 65–99)
HDLC SERPL-MCNC: 65 MG/DL
LDLC SERPL CALC-MCNC: 125 MG/DL
POTASSIUM SERPL-SCNC: 4 MMOL/L (ref 3.6–5.5)
PROT SERPL-MCNC: 7.7 G/DL (ref 6–8.2)
SODIUM SERPL-SCNC: 135 MMOL/L (ref 135–145)
TRIGL SERPL-MCNC: 74 MG/DL (ref 0–149)

## 2017-09-08 PROCEDURE — 36415 COLL VENOUS BLD VENIPUNCTURE: CPT

## 2017-09-08 PROCEDURE — 93306 TTE W/DOPPLER COMPLETE: CPT

## 2017-09-08 PROCEDURE — 80061 LIPID PANEL: CPT

## 2017-09-08 PROCEDURE — 80053 COMPREHEN METABOLIC PANEL: CPT

## 2017-09-08 PROCEDURE — 93306 TTE W/DOPPLER COMPLETE: CPT | Mod: 26 | Performed by: INTERNAL MEDICINE

## 2017-09-11 LAB
LV EJECT FRACT  99904: 65
LV EJECT FRACT MOD 2C 99903: 76.51
LV EJECT FRACT MOD 4C 99902: 72.32
LV EJECT FRACT MOD BP 99901: 73.97

## 2017-10-03 RX ORDER — MELOXICAM 7.5 MG/1
TABLET ORAL
Qty: 30 TAB | Refills: 1 | Status: SHIPPED | OUTPATIENT
Start: 2017-10-03 | End: 2017-12-02 | Stop reason: SDUPTHER

## 2017-10-03 NOTE — TELEPHONE ENCOUNTER
Was the patient seen in the last year in this department? Yes     Does patient have an active prescription for medications requested? Yes     Received Request Via: Pharmacy     Last office visit 04/06/17  Last labs 09/08/17

## 2017-10-10 ENCOUNTER — OFFICE VISIT (OUTPATIENT)
Dept: CARDIOLOGY | Facility: MEDICAL CENTER | Age: 82
End: 2017-10-10
Payer: COMMERCIAL

## 2017-10-10 VITALS
OXYGEN SATURATION: 95 % | DIASTOLIC BLOOD PRESSURE: 84 MMHG | HEART RATE: 68 BPM | BODY MASS INDEX: 22.33 KG/M2 | WEIGHT: 134 LBS | SYSTOLIC BLOOD PRESSURE: 134 MMHG | HEIGHT: 65 IN

## 2017-10-10 DIAGNOSIS — I10 ESSENTIAL HYPERTENSION: ICD-10-CM

## 2017-10-10 DIAGNOSIS — I35.0 NONRHEUMATIC AORTIC VALVE STENOSIS: ICD-10-CM

## 2017-10-10 PROCEDURE — 99214 OFFICE O/P EST MOD 30 MIN: CPT | Performed by: INTERNAL MEDICINE

## 2017-10-10 ASSESSMENT — ENCOUNTER SYMPTOMS
MYALGIAS: 0
DEPRESSION: 0
HEADACHES: 0
BRUISES/BLEEDS EASILY: 0
PND: 0
DIZZINESS: 0
EYE DISCHARGE: 0
BLURRED VISION: 0
SHORTNESS OF BREATH: 0
HEARTBURN: 0
COUGH: 0
NAUSEA: 0
PALPITATIONS: 0
NERVOUS/ANXIOUS: 0
FEVER: 0
CHILLS: 0

## 2017-10-10 NOTE — LETTER
[facility]   [address]   [address]  [place] [address]  Phone: [phone]  Fax: [phone]              Tamy Calvin  1935    Encounter Date: 10/10/2017    Rob Ireland M.D.          PROGRESS NOTE:  Subjective:   Tamy Calvin is a 82 y.o. female who presents today In annual follow-up for aortic stenosis and hypertension.  Her daughter as .  The patient lives with her daughter.  She walks up to 2 miles per day and does yard work and housework with no symptoms of lightheadedness, chest pressure, or shortness of breath.  Morning blood pressures are sometimes 157 systolic and within one hour are below 140.  Recent chem panel and lipid panel reviewed and values are stable.    Echocardiogram demonstrates progressive gradient across the aortic stenosis which is now severe. Left ventricular systolic function normal.    Past Medical History:   Diagnosis Date   • Bilateral cataracts 6/25/2012   • Arrhythmia     unknown type.  pt is on sotalol   • Cataract    • Heart murmur    • Hypertension    • Ischemic heart disease    • Multiple thyroid nodules     reportedly had thyroid us in [place] 4/12 that was stable.    • Osteoporosis      Past Surgical History:   Procedure Laterality Date   • ABDOMINAL HYSTERECTOMY TOTAL       Family History   Problem Relation Age of Onset   • Cancer Daughter      breast cancer    • Psychiatry Daughter    • Genetic Maternal Grandmother      History   Smoking Status   • Never Smoker   Smokeless Tobacco   • Never Used     Allergies   Allergen Reactions   • Pcn [Penicillins]      Outpatient Encounter Prescriptions as of 10/10/2017   Medication Sig Dispense Refill   • Fexofenadine HCl (ALLEGRA ALLERGY PO) Take  by mouth.     • meloxicam (MOBIC) 7.5 MG Tab TAKE ONE (1) TABLET BY MOUTH EVERY DAY 30 Tab 1   • lisinopril-hydrochlorothiazide (PRINZIDE, ZESTORETIC) 20-12.5 MG per tablet Take 1  "Tab by mouth every day. 90 Tab 1   • hydrocortisone 2.5 % Cream topical cream Apply sparingly to affected area bid 1 Tube 0   • amlodipine (NORVASC) 2.5 MG Tab TAKE ONE TABLET BY MOUTH ONCE DAILY 90 Tab 3   • carvedilol (COREG) 6.25 MG Tab TAKE ONE TABLET BY MOUTH TWICE DAILY WITH MEALS 180 Tab 1   • Diclofenac Sodium 1 % GEL Apply 1 Application to skin as directed 3 times a day. 45 g 1   • aspirin EC (ECOTRIN) 81 MG TBEC Take 81 mg by mouth every day.     • doxycycline (VIBRAMYCIN) 100 MG Tab Take 1 Tab by mouth 2 times a day. 20 Tab 0   • alprazolam (XANAX) 0.25 MG Tab Take 1 Tab by mouth at bedtime as needed. May need  For anxiety 30 Tab 0   • carvedilol (COREG) 6.25 MG Tab TAKE ONE TABLET BY MOUTH TWICE DAILY WITH MEALS 60 Tab 11   • MethylPREDNISolone (MEDROL DOSEPAK) 4 MG Tablet Therapy Pack As dir 1 Kit 0   • citalopram (CELEXA) 20 MG Tab Take 1 Tab by mouth every day. 30 Tab 3   • Loratadine (CLARITIN) 5 MG Chew Tab Take  by mouth.       No facility-administered encounter medications on file as of 10/10/2017.      Review of Systems   Constitutional: Negative for chills, fever and malaise/fatigue.   Eyes: Negative for blurred vision and discharge.   Respiratory: Negative for cough and shortness of breath.    Cardiovascular: Negative for chest pain, palpitations, leg swelling and PND.   Gastrointestinal: Negative for heartburn and nausea.   Genitourinary: Negative for dysuria and urgency.   Musculoskeletal: Positive for joint pain. Negative for myalgias.   Skin: Negative for itching and rash.   Neurological: Negative for dizziness and headaches.   Endo/Heme/Allergies: Negative for environmental allergies. Does not bruise/bleed easily.   Psychiatric/Behavioral: Negative for depression. The patient is not nervous/anxious.         Objective:   /84   Pulse 68   Ht 1.651 m (5' 5\")   Wt 60.8 kg (134 lb)   SpO2 95%   BMI 22.30 kg/m²      Physical Exam   Constitutional: She is oriented to person, place, and " time. She appears well-developed and well-nourished.   HENT:   Head: Normocephalic and atraumatic.   Eyes: Conjunctivae and EOM are normal. No scleral icterus.   Neck: Neck supple. No JVD present. No thyromegaly present.   Cardiovascular: Normal rate and regular rhythm.   No extrasystoles are present. Exam reveals no gallop and no friction rub.    Murmur heard.   Systolic murmur is present with a grade of 3/6   Murmur is faintly transmitted to both carotids   Pulmonary/Chest: Effort normal and breath sounds normal. No respiratory distress. She has no wheezes. She has no rales. She exhibits no tenderness.   Abdominal: Soft. Bowel sounds are normal. She exhibits no distension and no mass. There is no tenderness.   Musculoskeletal: She exhibits no edema.   No edema noted    Pain with abduction of right shoulder. No palpable crepitus   Neurological: She is alert and oriented to person, place, and time. Coordination normal.   Skin: Skin is warm and dry. No rash noted. No pallor.   Psychiatric: She has a normal mood and affect. Her behavior is normal. Judgment and thought content normal.       Assessment:     1. Nonrheumatic aortic valve stenosis  Echocardiogram Comp w/o Cont   2. Essential hypertension  Echocardiogram Comp w/o Cont       Medical Decision Making:  Today's Assessment / Status / Plan:   She appears to be asymptomatic in spite of progressive stenosis across the aortic valve.  Cardinal symptoms of decompensation discussed with daughter and emphasized to patient.  I'll return in 9 months with previsit echo or sooner if symptoms develop      Baltazar Hopkins M.D.  [address] [facility] [address]  VIA In Basket

## 2017-10-10 NOTE — PROGRESS NOTES
Subjective:   Tamy Calvin is a 82 y.o. female who presents today In annual follow-up for aortic stenosis and hypertension.  Her daughter as .  The patient lives with her daughter.  She walks up to 2 miles per day and does yard work and housework with no symptoms of lightheadedness, chest pressure, or shortness of breath.  Morning blood pressures are sometimes 157 systolic and within one hour are below 140.  Recent chem panel and lipid panel reviewed and values are stable.    Echocardiogram demonstrates progressive gradient across the aortic stenosis which is now severe. Left ventricular systolic function normal.    Past Medical History:   Diagnosis Date   • Bilateral cataracts 6/25/2012   • Arrhythmia     unknown type.  pt is on sotalol   • Cataract    • Heart murmur    • Hypertension    • Ischemic heart disease    • Multiple thyroid nodules     reportedly had thyroid us in [place] 4/12 that was stable.    • Osteoporosis      Past Surgical History:   Procedure Laterality Date   • ABDOMINAL HYSTERECTOMY TOTAL       Family History   Problem Relation Age of Onset   • Cancer Daughter      breast cancer    • Psychiatry Daughter    • Genetic Maternal Grandmother      History   Smoking Status   • Never Smoker   Smokeless Tobacco   • Never Used     Allergies   Allergen Reactions   • Pcn [Penicillins]      Outpatient Encounter Prescriptions as of 10/10/2017   Medication Sig Dispense Refill   • Fexofenadine HCl (ALLEGRA ALLERGY PO) Take  by mouth.     • meloxicam (MOBIC) 7.5 MG Tab TAKE ONE (1) TABLET BY MOUTH EVERY DAY 30 Tab 1   • lisinopril-hydrochlorothiazide (PRINZIDE, ZESTORETIC) 20-12.5 MG per tablet Take 1 Tab by mouth every day. 90 Tab 1   • hydrocortisone 2.5 % Cream topical cream Apply sparingly to affected area bid 1 Tube 0   • amlodipine (NORVASC) 2.5 MG Tab TAKE ONE TABLET BY MOUTH ONCE DAILY 90 Tab 3   • carvedilol (COREG) 6.25 MG Tab TAKE ONE TABLET BY MOUTH TWICE DAILY WITH MEALS 180 Tab 1  "  • Diclofenac Sodium 1 % GEL Apply 1 Application to skin as directed 3 times a day. 45 g 1   • aspirin EC (ECOTRIN) 81 MG TBEC Take 81 mg by mouth every day.     • doxycycline (VIBRAMYCIN) 100 MG Tab Take 1 Tab by mouth 2 times a day. 20 Tab 0   • alprazolam (XANAX) 0.25 MG Tab Take 1 Tab by mouth at bedtime as needed. May need  For anxiety 30 Tab 0   • carvedilol (COREG) 6.25 MG Tab TAKE ONE TABLET BY MOUTH TWICE DAILY WITH MEALS 60 Tab 11   • MethylPREDNISolone (MEDROL DOSEPAK) 4 MG Tablet Therapy Pack As dir 1 Kit 0   • citalopram (CELEXA) 20 MG Tab Take 1 Tab by mouth every day. 30 Tab 3   • Loratadine (CLARITIN) 5 MG Chew Tab Take  by mouth.       No facility-administered encounter medications on file as of 10/10/2017.      Review of Systems   Constitutional: Negative for chills, fever and malaise/fatigue.   Eyes: Negative for blurred vision and discharge.   Respiratory: Negative for cough and shortness of breath.    Cardiovascular: Negative for chest pain, palpitations, leg swelling and PND.   Gastrointestinal: Negative for heartburn and nausea.   Genitourinary: Negative for dysuria and urgency.   Musculoskeletal: Positive for joint pain. Negative for myalgias.   Skin: Negative for itching and rash.   Neurological: Negative for dizziness and headaches.   Endo/Heme/Allergies: Negative for environmental allergies. Does not bruise/bleed easily.   Psychiatric/Behavioral: Negative for depression. The patient is not nervous/anxious.         Objective:   /84   Pulse 68   Ht 1.651 m (5' 5\")   Wt 60.8 kg (134 lb)   SpO2 95%   BMI 22.30 kg/m²     Physical Exam   Constitutional: She is oriented to person, place, and time. She appears well-developed and well-nourished.   HENT:   Head: Normocephalic and atraumatic.   Eyes: Conjunctivae and EOM are normal. No scleral icterus.   Neck: Neck supple. No JVD present. No thyromegaly present.   Cardiovascular: Normal rate and regular rhythm.   No extrasystoles are " present. Exam reveals no gallop and no friction rub.    Murmur heard.   Systolic murmur is present with a grade of 3/6   Murmur is faintly transmitted to both carotids   Pulmonary/Chest: Effort normal and breath sounds normal. No respiratory distress. She has no wheezes. She has no rales. She exhibits no tenderness.   Abdominal: Soft. Bowel sounds are normal. She exhibits no distension and no mass. There is no tenderness.   Musculoskeletal: She exhibits no edema.   No edema noted    Pain with abduction of right shoulder. No palpable crepitus   Neurological: She is alert and oriented to person, place, and time. Coordination normal.   Skin: Skin is warm and dry. No rash noted. No pallor.   Psychiatric: She has a normal mood and affect. Her behavior is normal. Judgment and thought content normal.       Assessment:     1. Nonrheumatic aortic valve stenosis  Echocardiogram Comp w/o Cont   2. Essential hypertension  Echocardiogram Comp w/o Cont       Medical Decision Making:  Today's Assessment / Status / Plan:   She appears to be asymptomatic in spite of progressive stenosis across the aortic valve.  Cardinal symptoms of decompensation discussed with daughter and emphasized to patient.  I'll return in 9 months with previsit echo or sooner if symptoms develop

## 2017-12-02 DIAGNOSIS — I10 ESSENTIAL HYPERTENSION: ICD-10-CM

## 2017-12-04 RX ORDER — AMLODIPINE BESYLATE 2.5 MG/1
TABLET ORAL
Qty: 90 TAB | Refills: 0 | Status: SHIPPED | OUTPATIENT
Start: 2017-12-04 | End: 2018-02-28 | Stop reason: SDUPTHER

## 2017-12-04 RX ORDER — MELOXICAM 7.5 MG/1
TABLET ORAL
Qty: 90 TAB | Refills: 0 | Status: SHIPPED | OUTPATIENT
Start: 2017-12-04 | End: 2018-02-28 | Stop reason: SDUPTHER

## 2017-12-04 RX ORDER — LISINOPRIL AND HYDROCHLOROTHIAZIDE 20; 12.5 MG/1; MG/1
TABLET ORAL
Qty: 90 TAB | Refills: 0 | Status: SHIPPED | OUTPATIENT
Start: 2017-12-04 | End: 2018-02-28 | Stop reason: SDUPTHER

## 2017-12-04 NOTE — TELEPHONE ENCOUNTER
Was the patient seen in the last year in this department? Yes     Does patient have an active prescription for medications requested? No     Received Request Via: Pharmacy   Last seen  4/6/17  Last labs   9/8/17

## 2018-01-29 RX ORDER — CARVEDILOL 6.25 MG/1
TABLET ORAL
Qty: 60 TAB | Refills: 6 | Status: SHIPPED | OUTPATIENT
Start: 2018-01-29 | End: 2018-09-04 | Stop reason: SDUPTHER

## 2018-02-28 DIAGNOSIS — I10 ESSENTIAL HYPERTENSION: ICD-10-CM

## 2018-02-28 RX ORDER — MELOXICAM 7.5 MG/1
TABLET ORAL
Qty: 90 TAB | Refills: 0 | Status: SHIPPED | OUTPATIENT
Start: 2018-02-28 | End: 2018-06-04 | Stop reason: SDUPTHER

## 2018-02-28 RX ORDER — AMLODIPINE BESYLATE 2.5 MG/1
TABLET ORAL
Qty: 90 TAB | Refills: 0 | Status: SHIPPED | OUTPATIENT
Start: 2018-02-28 | End: 2018-06-04 | Stop reason: SDUPTHER

## 2018-02-28 RX ORDER — LISINOPRIL AND HYDROCHLOROTHIAZIDE 20; 12.5 MG/1; MG/1
TABLET ORAL
Qty: 90 TAB | Refills: 0 | Status: SHIPPED | OUTPATIENT
Start: 2018-02-28 | End: 2018-06-04 | Stop reason: SDUPTHER

## 2018-02-28 NOTE — TELEPHONE ENCOUNTER
Was the patient seen in the last year in this department? Yes     Does patient have an active prescription for medications requested? No     Received Request Via: Pharmacy     Last Visit: 06/05/17  Last Labs: 09/08/17  Next Appt: N/A

## 2018-04-10 DIAGNOSIS — I25.9 ISCHEMIC HEART DISEASE: ICD-10-CM

## 2018-04-10 DIAGNOSIS — I35.0 AORTIC VALVE STENOSIS, ETIOLOGY OF CARDIAC VALVE DISEASE UNSPECIFIED: ICD-10-CM

## 2018-06-04 DIAGNOSIS — I10 ESSENTIAL HYPERTENSION: ICD-10-CM

## 2018-06-04 RX ORDER — MELOXICAM 7.5 MG/1
TABLET ORAL
Qty: 90 TAB | Refills: 0 | Status: SHIPPED | OUTPATIENT
Start: 2018-06-04 | End: 2019-05-06

## 2018-06-04 RX ORDER — LISINOPRIL AND HYDROCHLOROTHIAZIDE 20; 12.5 MG/1; MG/1
TABLET ORAL
Qty: 90 TAB | Refills: 0 | Status: SHIPPED | OUTPATIENT
Start: 2018-06-04 | End: 2018-06-06 | Stop reason: SDUPTHER

## 2018-06-04 RX ORDER — AMLODIPINE BESYLATE 2.5 MG/1
TABLET ORAL
Qty: 90 TAB | Refills: 0 | Status: SHIPPED | OUTPATIENT
Start: 2018-06-04 | End: 2018-06-06 | Stop reason: SDUPTHER

## 2018-06-04 NOTE — TELEPHONE ENCOUNTER
Was the patient seen in the last year in this department? Yes     Does patient have an active prescription for medications requested? Yes     Received Request Via: Pharmacy     Last visit 6/5/2017  Last labs 9/8/2017

## 2018-06-06 DIAGNOSIS — I10 ESSENTIAL HYPERTENSION: ICD-10-CM

## 2018-06-06 RX ORDER — LISINOPRIL AND HYDROCHLOROTHIAZIDE 20; 12.5 MG/1; MG/1
TABLET ORAL
Qty: 90 TAB | Refills: 1 | Status: SHIPPED | OUTPATIENT
Start: 2018-06-06 | End: 2018-11-29 | Stop reason: SDUPTHER

## 2018-06-06 RX ORDER — AMLODIPINE BESYLATE 2.5 MG/1
TABLET ORAL
Qty: 90 TAB | Refills: 1 | Status: SHIPPED | OUTPATIENT
Start: 2018-06-06 | End: 2019-02-27 | Stop reason: SDUPTHER

## 2018-06-19 ENCOUNTER — HOSPITAL ENCOUNTER (OUTPATIENT)
Dept: LAB | Facility: MEDICAL CENTER | Age: 83
End: 2018-06-19
Attending: INTERNAL MEDICINE
Payer: COMMERCIAL

## 2018-06-19 ENCOUNTER — HOSPITAL ENCOUNTER (OUTPATIENT)
Dept: CARDIOLOGY | Facility: MEDICAL CENTER | Age: 83
End: 2018-06-19
Attending: INTERNAL MEDICINE
Payer: COMMERCIAL

## 2018-06-19 DIAGNOSIS — I35.0 AORTIC VALVE STENOSIS, ETIOLOGY OF CARDIAC VALVE DISEASE UNSPECIFIED: ICD-10-CM

## 2018-06-19 DIAGNOSIS — I25.9 ISCHEMIC HEART DISEASE: ICD-10-CM

## 2018-06-19 DIAGNOSIS — I35.0 NONRHEUMATIC AORTIC VALVE STENOSIS: ICD-10-CM

## 2018-06-19 DIAGNOSIS — I10 ESSENTIAL HYPERTENSION: ICD-10-CM

## 2018-06-19 LAB
ALBUMIN SERPL BCP-MCNC: 4.1 G/DL (ref 3.2–4.9)
ALBUMIN/GLOB SERPL: 1.5 G/DL
ALP SERPL-CCNC: 47 U/L (ref 30–99)
ALT SERPL-CCNC: 10 U/L (ref 2–50)
ANION GAP SERPL CALC-SCNC: 7 MMOL/L (ref 0–11.9)
AST SERPL-CCNC: 13 U/L (ref 12–45)
BILIRUB SERPL-MCNC: 0.8 MG/DL (ref 0.1–1.5)
BUN SERPL-MCNC: 22 MG/DL (ref 8–22)
CALCIUM SERPL-MCNC: 9.4 MG/DL (ref 8.5–10.5)
CHLORIDE SERPL-SCNC: 102 MMOL/L (ref 96–112)
CHOLEST SERPL-MCNC: 189 MG/DL (ref 100–199)
CO2 SERPL-SCNC: 27 MMOL/L (ref 20–33)
CREAT SERPL-MCNC: 0.79 MG/DL (ref 0.5–1.4)
GLOBULIN SER CALC-MCNC: 2.7 G/DL (ref 1.9–3.5)
GLUCOSE SERPL-MCNC: 86 MG/DL (ref 65–99)
HDLC SERPL-MCNC: 61 MG/DL
LDLC SERPL CALC-MCNC: 114 MG/DL
LV EJECT FRACT MOD 2C 99903: 57.34
LV EJECT FRACT MOD 4C 99902: 57.75
LV EJECT FRACT MOD BP 99901: 60.08
POTASSIUM SERPL-SCNC: 4.1 MMOL/L (ref 3.6–5.5)
PROT SERPL-MCNC: 6.8 G/DL (ref 6–8.2)
SODIUM SERPL-SCNC: 136 MMOL/L (ref 135–145)
TRIGL SERPL-MCNC: 68 MG/DL (ref 0–149)

## 2018-06-19 PROCEDURE — 93306 TTE W/DOPPLER COMPLETE: CPT | Mod: 26 | Performed by: INTERNAL MEDICINE

## 2018-06-19 PROCEDURE — 80053 COMPREHEN METABOLIC PANEL: CPT

## 2018-06-19 PROCEDURE — 93306 TTE W/DOPPLER COMPLETE: CPT

## 2018-06-19 PROCEDURE — 36415 COLL VENOUS BLD VENIPUNCTURE: CPT

## 2018-06-19 PROCEDURE — 80061 LIPID PANEL: CPT

## 2018-07-16 ENCOUNTER — OFFICE VISIT (OUTPATIENT)
Dept: CARDIOLOGY | Facility: MEDICAL CENTER | Age: 83
End: 2018-07-16
Payer: COMMERCIAL

## 2018-07-16 VITALS
HEART RATE: 70 BPM | DIASTOLIC BLOOD PRESSURE: 80 MMHG | SYSTOLIC BLOOD PRESSURE: 126 MMHG | HEIGHT: 64 IN | BODY MASS INDEX: 22.2 KG/M2 | WEIGHT: 130 LBS | OXYGEN SATURATION: 93 %

## 2018-07-16 DIAGNOSIS — I35.0 AORTIC VALVE STENOSIS, ETIOLOGY OF CARDIAC VALVE DISEASE UNSPECIFIED: ICD-10-CM

## 2018-07-16 DIAGNOSIS — I10 ESSENTIAL HYPERTENSION: ICD-10-CM

## 2018-07-16 PROCEDURE — 99214 OFFICE O/P EST MOD 30 MIN: CPT | Performed by: INTERNAL MEDICINE

## 2018-07-16 ASSESSMENT — ENCOUNTER SYMPTOMS
DEPRESSION: 0
BRUISES/BLEEDS EASILY: 0
SHORTNESS OF BREATH: 0
BLURRED VISION: 0
HEARTBURN: 0
COUGH: 0
PND: 0
NAUSEA: 0
HEADACHES: 0
DIZZINESS: 0
PALPITATIONS: 0
EYE DISCHARGE: 0
FEVER: 0
CHILLS: 0
MYALGIAS: 0
NERVOUS/ANXIOUS: 0

## 2018-07-16 NOTE — PROGRESS NOTES
Chief Complaint   Patient presents with   • Aortic Stenosis       Subjective:   Tamy Calvin is a 83 y.o. female who presents today in follow-up for severe aortic stenosis.  The patient continues to live with her daughter.  There was a recent move to a new location which was very stressful for the patient.  Her daughter thinks she takes her high blood pressure medicine only when the blood pressure is high as opposed to daily which was prescribed.    It is not clear if she has exercise intolerance and exercise shortness of breath.,  Probably not.  History is difficult in part due to language barrier but also due to the patient's own personality.  The daughter reports a hospitalization in [place] for hypertension and excessive urination.  The very extensive evaluation was done with apparently the only documentation of these known severe aortic stenosis.    Our recent echo demonstrates really no change over the years in terms of systolic gradient.  Left ventricular systolic function normal    Recent episode of vertigo or near fainting while sitting on the toilet.  The daughter thinks the patient limits her walking because she is getting old.  Certainly no chest discomfort or obvious effort induced dyspnea or dyspnea at rest  No clear-cut presyncope or syncope    Past Medical History:   Diagnosis Date   • Arrhythmia     unknown type.  pt is on sotalol   • Bilateral cataracts 6/25/2012   • Cataract    • Heart murmur    • Hypertension    • Ischemic heart disease    • Multiple thyroid nodules     reportedly had thyroid us in [place] 4/12 that was stable.    • Osteoporosis      Past Surgical History:   Procedure Laterality Date   • ABDOMINAL HYSTERECTOMY TOTAL       Family History   Problem Relation Age of Onset   • Cancer Daughter      breast cancer    • Genetic Maternal Grandmother    • Psychiatry Daughter      Social History     Social History   • Marital status:      Spouse name: N/A   • Number of  children: N/A   • Years of education: N/A     Occupational History   • Not on file.     Social History Main Topics   • Smoking status: Never Smoker   • Smokeless tobacco: Never Used   • Alcohol use No   • Drug use: No   • Sexual activity: Not Currently     Other Topics Concern   • Not on file     Social History Narrative   • No narrative on file     Allergies   Allergen Reactions   • Pcn [Penicillins]      Outpatient Encounter Prescriptions as of 7/16/2018   Medication Sig Dispense Refill   • amLODIPine (NORVASC) 2.5 MG Tab TAKE ONE (1) TABLET BY MOUTH EVERY DAY 90 Tab 1   • lisinopril-hydrochlorothiazide (PRINZIDE, ZESTORETIC) 20-12.5 MG per tablet TAKE ONE (1) TABLET BY MOUTH EVERY DAY 90 Tab 1   • meloxicam (MOBIC) 7.5 MG Tab TAKE ONE (1) TABLET BY MOUTH EVERY DAY 90 Tab 0   • carvedilol (COREG) 6.25 MG Tab TAKE ONE (1) TABLET BY MOUTH TWICE DAILY WITH MEALS  60 Tab 6   • Fexofenadine HCl (ALLEGRA ALLERGY PO) Take  by mouth.     • aspirin EC (ECOTRIN) 81 MG TBEC Take 81 mg by mouth every day.     • hydrocortisone 2.5 % Cream topical cream Apply sparingly to affected area bid 1 Tube 0   • doxycycline (VIBRAMYCIN) 100 MG Tab Take 1 Tab by mouth 2 times a day. 20 Tab 0   • alprazolam (XANAX) 0.25 MG Tab Take 1 Tab by mouth at bedtime as needed. May need  For anxiety 30 Tab 0   • MethylPREDNISolone (MEDROL DOSEPAK) 4 MG Tablet Therapy Pack As dir 1 Kit 0   • citalopram (CELEXA) 20 MG Tab Take 1 Tab by mouth every day. 30 Tab 3   • Loratadine (CLARITIN) 5 MG Chew Tab Take  by mouth.     • Diclofenac Sodium 1 % GEL Apply 1 Application to skin as directed 3 times a day. 45 g 1     No facility-administered encounter medications on file as of 7/16/2018.      Review of Systems   Constitutional: Negative for chills, fever and malaise/fatigue.   Eyes: Negative for blurred vision and discharge.   Respiratory: Negative for cough and shortness of breath.    Cardiovascular: Negative for chest pain, palpitations, leg swelling and  "PND.   Gastrointestinal: Negative for heartburn and nausea.   Genitourinary: Negative for dysuria and urgency.   Musculoskeletal: Positive for joint pain. Negative for myalgias.   Skin: Negative for itching and rash.   Neurological: Negative for dizziness and headaches.   Endo/Heme/Allergies: Negative for environmental allergies. Does not bruise/bleed easily.   Psychiatric/Behavioral: Negative for depression. The patient is not nervous/anxious.         Objective:   /80   Pulse 70   Ht 1.626 m (5' 4\")   Wt 59 kg (130 lb)   SpO2 93%   BMI 22.31 kg/m²     Physical Exam   Constitutional: She is oriented to person, place, and time. She appears well-developed and well-nourished.   Neck: No JVD present.   Cardiovascular: Normal rate and regular rhythm.    Murmur heard.   Systolic murmur is present with a grade of 3/6   Physical findings of reduced carotid upstroke   Pulmonary/Chest: Effort normal and breath sounds normal.   Musculoskeletal: She exhibits no edema.   Neurological: She is alert and oriented to person, place, and time.   Skin: Skin is warm and dry.       Assessment:     1. Essential hypertension     2. Aortic valve stenosis, etiology of cardiac valve disease unspecified         Medical Decision Making:  Today's Assessment / Status / Plan:   Continued close clinical observation.  Warning signs discussed with daughter.  Return in 4 months  "

## 2018-07-16 NOTE — LETTER
[facility]   [address]   [address]   [place] [address]  Phone: [phone]  Fax: [phone]              Tamy Calvin  1935    Encounter Date: 7/16/2018    Rob Ireland M.D.          PROGRESS NOTE:  Chief Complaint   Patient presents with   • Aortic Stenosis       Subjective:   Tamy Calvin is a 83 y.o. female who presents today in follow-up for severe aortic stenosis.  The patient continues to live with her daughter.  There was a recent move to a new location which was very stressful for the patient.  Her daughter thinks she takes her high blood pressure medicine only when the blood pressure is high as opposed to daily which was prescribed.    It is not clear if she has exercise intolerance and exercise shortness of breath.,  Probably not.  History is difficult in part due to language barrier but also due to the patient's own personality.  The daughter reports a hospitalization in [place] for hypertension and excessive urination.  The very extensive evaluation was done with apparently the only documentation of these known severe aortic stenosis.    Our recent echo demonstrates really no change over the years in terms of systolic gradient.  Left ventricular systolic function normal    Recent episode of vertigo or near fainting while sitting on the toilet.  The daughter thinks the patient limits her walking because she is getting old.  Certainly no chest discomfort or obvious effort induced dyspnea or dyspnea at rest  No clear-cut presyncope or syncope    Past Medical History:   Diagnosis Date   • Arrhythmia     unknown type.  pt is on sotalol   • Bilateral cataracts 6/25/2012   • Cataract    • Heart murmur    • Hypertension    • Ischemic heart disease    • Multiple thyroid nodules     reportedly had thyroid us in [place] 4/12 that was stable.    • Osteoporosis      Past Surgical History:   Procedure  Laterality Date   • ABDOMINAL HYSTERECTOMY TOTAL       Family History   Problem Relation Age of Onset   • Cancer Daughter      breast cancer    • Genetic Maternal Grandmother    • Psychiatry Daughter      Social History     Social History   • Marital status:      Spouse name: N/A   • Number of children: N/A   • Years of education: N/A     Occupational History   • Not on file.     Social History Main Topics   • Smoking status: Never Smoker   • Smokeless tobacco: Never Used   • Alcohol use No   • Drug use: No   • Sexual activity: Not Currently     Other Topics Concern   • Not on file     Social History Narrative   • No narrative on file     Allergies   Allergen Reactions   • Pcn [Penicillins]      Outpatient Encounter Prescriptions as of 7/16/2018   Medication Sig Dispense Refill   • amLODIPine (NORVASC) 2.5 MG Tab TAKE ONE (1) TABLET BY MOUTH EVERY DAY 90 Tab 1   • lisinopril-hydrochlorothiazide (PRINZIDE, ZESTORETIC) 20-12.5 MG per tablet TAKE ONE (1) TABLET BY MOUTH EVERY DAY 90 Tab 1   • meloxicam (MOBIC) 7.5 MG Tab TAKE ONE (1) TABLET BY MOUTH EVERY DAY 90 Tab 0   • carvedilol (COREG) 6.25 MG Tab TAKE ONE (1) TABLET BY MOUTH TWICE DAILY WITH MEALS  60 Tab 6   • Fexofenadine HCl (ALLEGRA ALLERGY PO) Take  by mouth.     • aspirin EC (ECOTRIN) 81 MG TBEC Take 81 mg by mouth every day.     • hydrocortisone 2.5 % Cream topical cream Apply sparingly to affected area bid 1 Tube 0   • doxycycline (VIBRAMYCIN) 100 MG Tab Take 1 Tab by mouth 2 times a day. 20 Tab 0   • alprazolam (XANAX) 0.25 MG Tab Take 1 Tab by mouth at bedtime as needed. May need  For anxiety 30 Tab 0   • MethylPREDNISolone (MEDROL DOSEPAK) 4 MG Tablet Therapy Pack As dir 1 Kit 0   • citalopram (CELEXA) 20 MG Tab Take 1 Tab by mouth every day. 30 Tab 3   • Loratadine (CLARITIN) 5 MG Chew Tab Take  by mouth.     • Diclofenac Sodium 1 % GEL Apply 1 Application to skin as directed 3 times a day. 45 g 1     No facility-administered encounter  "medications on file as of 7/16/2018.      Review of Systems   Constitutional: Negative for chills, fever and malaise/fatigue.   Eyes: Negative for blurred vision and discharge.   Respiratory: Negative for cough and shortness of breath.    Cardiovascular: Negative for chest pain, palpitations, leg swelling and PND.   Gastrointestinal: Negative for heartburn and nausea.   Genitourinary: Negative for dysuria and urgency.   Musculoskeletal: Positive for joint pain. Negative for myalgias.   Skin: Negative for itching and rash.   Neurological: Negative for dizziness and headaches.   Endo/Heme/Allergies: Negative for environmental allergies. Does not bruise/bleed easily.   Psychiatric/Behavioral: Negative for depression. The patient is not nervous/anxious.         Objective:   /80   Pulse 70   Ht 1.626 m (5' 4\")   Wt 59 kg (130 lb)   SpO2 93%   BMI 22.31 kg/m²      Physical Exam   Constitutional: She is oriented to person, place, and time. She appears well-developed and well-nourished.   Neck: No JVD present.   Cardiovascular: Normal rate and regular rhythm.    Murmur heard.   Systolic murmur is present with a grade of 3/6   Physical findings of reduced carotid upstroke   Pulmonary/Chest: Effort normal and breath sounds normal.   Musculoskeletal: She exhibits no edema.   Neurological: She is alert and oriented to person, place, and time.   Skin: Skin is warm and dry.       Assessment:     1. Essential hypertension     2. Aortic valve stenosis, etiology of cardiac valve disease unspecified         Medical Decision Making:  Today's Assessment / Status / Plan:   Continued close clinical observation.  Warning signs discussed with daughter.  Return in 4 months      Baltazar Hopkins M.D.  [address] [facility] [address]  VIA In Basket                 "

## 2018-09-04 DIAGNOSIS — I10 ESSENTIAL HYPERTENSION: Primary | ICD-10-CM

## 2018-09-04 DIAGNOSIS — I10 ESSENTIAL HYPERTENSION: ICD-10-CM

## 2018-09-04 RX ORDER — LISINOPRIL AND HYDROCHLOROTHIAZIDE 20; 12.5 MG/1; MG/1
TABLET ORAL
Qty: 90 TAB | Refills: 1 | OUTPATIENT
Start: 2018-09-04

## 2018-09-04 NOTE — TELEPHONE ENCOUNTER
Was the patient seen in the last year in this department? Yes    Does patient have an active prescription for medications requested? Yes    Received Request Via: Pharmacy

## 2018-09-05 RX ORDER — CARVEDILOL 6.25 MG/1
6.25 TABLET ORAL 2 TIMES DAILY WITH MEALS
Qty: 180 TAB | Refills: 2 | Status: SHIPPED | OUTPATIENT
Start: 2018-09-05 | End: 2019-05-30 | Stop reason: SDUPTHER

## 2018-09-12 ENCOUNTER — OFFICE VISIT (OUTPATIENT)
Dept: MEDICAL GROUP | Facility: PHYSICIAN GROUP | Age: 83
End: 2018-09-12
Payer: COMMERCIAL

## 2018-09-12 VITALS
TEMPERATURE: 98.5 F | WEIGHT: 124 LBS | HEIGHT: 64 IN | DIASTOLIC BLOOD PRESSURE: 64 MMHG | RESPIRATION RATE: 12 BRPM | BODY MASS INDEX: 21.17 KG/M2 | SYSTOLIC BLOOD PRESSURE: 104 MMHG | HEART RATE: 66 BPM | OXYGEN SATURATION: 93 %

## 2018-09-12 DIAGNOSIS — L20.81 ATOPIC NEURODERMATITIS: ICD-10-CM

## 2018-09-12 DIAGNOSIS — I10 ESSENTIAL HYPERTENSION: ICD-10-CM

## 2018-09-12 PROCEDURE — 99214 OFFICE O/P EST MOD 30 MIN: CPT | Performed by: FAMILY MEDICINE

## 2018-09-12 RX ORDER — BETAMETHASONE DIPROPIONATE 0.5 MG/G
1 CREAM TOPICAL 2 TIMES DAILY
Qty: 1 TUBE | Refills: 6 | Status: SHIPPED | OUTPATIENT
Start: 2018-09-12 | End: 2020-02-11

## 2018-09-12 RX ORDER — HYDROXYZINE HYDROCHLORIDE 25 MG/1
25 TABLET, FILM COATED ORAL
Qty: 30 TAB | Refills: 6 | Status: SHIPPED | OUTPATIENT
Start: 2018-09-12 | End: 2018-10-12

## 2018-09-12 ASSESSMENT — ENCOUNTER SYMPTOMS
GASTROINTESTINAL NEGATIVE: 1
NEUROLOGICAL NEGATIVE: 1
NAUSEA: 0
MUSCULOSKELETAL NEGATIVE: 1
CONSTITUTIONAL NEGATIVE: 1
DIZZINESS: 0
BRUISES/BLEEDS EASILY: 0
FEVER: 0
BLURRED VISION: 0
RESPIRATORY NEGATIVE: 1
PALPITATIONS: 0
COUGH: 0
DEPRESSION: 0
EYES NEGATIVE: 1
PSYCHIATRIC NEGATIVE: 1
CHILLS: 0
TINGLING: 0
CARDIOVASCULAR NEGATIVE: 1
ANOREXIA: 0
HEMOPTYSIS: 0
MYALGIAS: 0
HEARTBURN: 0
DOUBLE VISION: 0
HEADACHES: 0

## 2018-09-12 ASSESSMENT — PATIENT HEALTH QUESTIONNAIRE - PHQ9: CLINICAL INTERPRETATION OF PHQ2 SCORE: 0

## 2018-09-12 NOTE — PROGRESS NOTES
Subjective:      Tamy Calvin is a 83 y.o. female who presents with Rash (X 10 days, burns,itchy, rought to touch)            Recurrent rash on both legs also with some watery nose and watery eyes, better in the past with steroid cream will treat again  Will keep bp log and f/u in 2 mo for that  Has seen cardiology for AS and non-surgical candidate now    1. Atopic neurodermatitis  =  - hydrOXYzine HCl (ATARAX) 25 MG Tab; Take 1 Tab by mouth at bedtime as needed for Itching for up to 30 days.  Dispense: 30 Tab; Refill: 6  - betamethasone dipropionate (DIPROLENE) 0.05 % Cream; Apply 1 g to affected area(s) 2 times a day.  Dispense: 1 Tube; Refill: 6    2. Essential hypertension      Past Medical History:  No date: Arrhythmia      Comment:  unknown type.  pt is on sotalol  6/25/2012: Bilateral cataracts  No date: Cataract  No date: Heart murmur  No date: Hypertension  No date: Ischemic heart disease  No date: Multiple thyroid nodules      Comment:  reportedly had thyroid us in [place] 4/12 that was                stable.   No date: Osteoporosis  Past Surgical History:  No date: ABDOMINAL HYSTERECTOMY TOTAL  Smoking status: Never Smoker                                                              Smokeless tobacco: Never Used                      Alcohol use: No              Review of patient's family history indicates:  Problem: Cancer      Relation: Daughter       Age of Onset: (Not Specified)       Comment: breast cancer   Problem: Genetic      Relation: Maternal Grandmother       Age of Onset: (Not Specified)   Problem: Psychiatry      Relation: Daughter       Age of Onset: (Not Specified)       Current Outpatient Prescriptions: •  hydrOXYzine HCl (ATARAX) 25 MG Tab, Take 1 Tab by mouth at bedtime as needed for Itching for up to 30 days., Disp: 30 Tab, Rfl: 6•  betamethasone dipropionate (DIPROLENE) 0.05 % Cream, Apply 1 g to affected area(s) 2 times a day., Disp: 1 Tube, Rfl: 6•  carvedilol (COREG) 6.25 MG  Tab, Take 1 Tab by mouth 2 times a day, with meals., Disp: 180 Tab, Rfl: 2•  amLODIPine (NORVASC) 2.5 MG Tab, TAKE ONE (1) TABLET BY MOUTH EVERY DAY, Disp: 90 Tab, Rfl: 1•  lisinopril-hydrochlorothiazide (PRINZIDE, ZESTORETIC) 20-12.5 MG per tablet, TAKE ONE (1) TABLET BY MOUTH EVERY DAY, Disp: 90 Tab, Rfl: 1•  meloxicam (MOBIC) 7.5 MG Tab, TAKE ONE (1) TABLET BY MOUTH EVERY DAY, Disp: 90 Tab, Rfl: 0•  hydrocortisone 2.5 % Cream topical cream, Apply sparingly to affected area bid, Disp: 1 Tube, Rfl: 0•  doxycycline (VIBRAMYCIN) 100 MG Tab, Take 1 Tab by mouth 2 times a day., Disp: 20 Tab, Rfl: 0•  alprazolam (XANAX) 0.25 MG Tab, Take 1 Tab by mouth at bedtime as needed. May need  For anxiety, Disp: 30 Tab, Rfl: 0•  citalopram (CELEXA) 20 MG Tab, Take 1 Tab by mouth every day., Disp: 30 Tab, Rfl: 3•  Diclofenac Sodium 1 % GEL, Apply 1 Application to skin as directed 3 times a day., Disp: 45 g, Rfl: 1•  Fexofenadine HCl (ALLEGRA ALLERGY PO), Take  by mouth., Disp: , Rfl: •  MethylPREDNISolone (MEDROL DOSEPAK) 4 MG Tablet Therapy Pack, As dir, Disp: 1 Kit, Rfl: 0•  Loratadine (CLARITIN) 5 MG Chew Tab, Take  by mouth., Disp: , Rfl: •  aspirin EC (ECOTRIN) 81 MG TBEC, Take 81 mg by mouth every day., Disp: , Rfl:      Patient was instructed on the use of medications, either prescriptions or OTC and informed on when the appropriate follow up time period should be. In addition, patient was also instructed that should any acute worsening occur that they should notify this clinic asap or call 911.          Rash   This is a recurrent problem. The current episode started more than 1 month ago. The problem has been waxing and waning since onset. The affected locations include the left lower leg and right lower leg. The rash is characterized by redness and itchiness. She was exposed to nothing. Pertinent negatives include no anorexia, congestion, cough or fever. Past treatments include topical steroids. The treatment provided  "moderate relief.       Review of Systems   Constitutional: Negative.  Negative for chills and fever.   HENT: Negative.  Negative for congestion and hearing loss.    Eyes: Negative.  Negative for blurred vision and double vision.   Respiratory: Negative.  Negative for cough and hemoptysis.    Cardiovascular: Negative.  Negative for chest pain and palpitations.   Gastrointestinal: Negative.  Negative for anorexia, heartburn and nausea.   Genitourinary: Negative.  Negative for dysuria.   Musculoskeletal: Negative.  Negative for myalgias.   Skin: Positive for itching and rash.   Neurological: Negative.  Negative for dizziness, tingling and headaches.   Endo/Heme/Allergies: Negative.  Does not bruise/bleed easily.   Psychiatric/Behavioral: Negative.  Negative for depression and suicidal ideas.   All other systems reviewed and are negative.         Objective:     /64   Pulse 66   Temp 36.9 °C (98.5 °F)   Resp 12   Ht 1.626 m (5' 4\")   Wt 56.2 kg (124 lb)   SpO2 93%   BMI 21.28 kg/m²      Physical Exam   Constitutional: She is oriented to person, place, and time. She appears well-developed and well-nourished. No distress.   HENT:   Head: Normocephalic and atraumatic.   Mouth/Throat: Oropharynx is clear and moist. No oropharyngeal exudate.   Eyes: Pupils are equal, round, and reactive to light.   Cardiovascular: Normal rate, regular rhythm and intact distal pulses.  Exam reveals no gallop and no friction rub.    Murmur heard.   Systolic murmur is present with a grade of 1/6    Diastolic murmur is present   Pulmonary/Chest: Effort normal and breath sounds normal. No respiratory distress. She has no wheezes. She has no rales. She exhibits no tenderness.   Neurological: She is alert and oriented to person, place, and time.   Skin: Rash noted. Rash is papular. She is not diaphoretic.        Psychiatric: She has a normal mood and affect. Her behavior is normal. Judgment and thought content normal.   Nursing note and " vitals reviewed.              Assessment/Plan:     1. Atopic neurodermatitis    - hydrOXYzine HCl (ATARAX) 25 MG Tab; Take 1 Tab by mouth at bedtime as needed for Itching for up to 30 days.  Dispense: 30 Tab; Refill: 6  - betamethasone dipropionate (DIPROLENE) 0.05 % Cream; Apply 1 g to affected area(s) 2 times a day.  Dispense: 1 Tube; Refill: 6    2. Essential hypertension

## 2018-11-29 DIAGNOSIS — I10 ESSENTIAL HYPERTENSION: ICD-10-CM

## 2018-11-29 RX ORDER — LISINOPRIL AND HYDROCHLOROTHIAZIDE 20; 12.5 MG/1; MG/1
TABLET ORAL
Qty: 90 TAB | Refills: 1 | Status: SHIPPED | OUTPATIENT
Start: 2018-11-29 | End: 2019-05-29 | Stop reason: SDUPTHER

## 2018-11-29 NOTE — TELEPHONE ENCOUNTER
Was the patient seen in the last year in this department? Yes    Does patient have an active prescription for medications requested? Yes    Received Request Via: Pharmacy     Last visit 9/12/2018  Last labs 6/19/2018

## 2018-12-04 ENCOUNTER — HOSPITAL ENCOUNTER (OUTPATIENT)
Dept: LAB | Facility: MEDICAL CENTER | Age: 83
End: 2018-12-04
Attending: FAMILY MEDICINE
Payer: COMMERCIAL

## 2018-12-04 ENCOUNTER — OFFICE VISIT (OUTPATIENT)
Dept: MEDICAL GROUP | Facility: PHYSICIAN GROUP | Age: 83
End: 2018-12-04
Payer: COMMERCIAL

## 2018-12-04 VITALS
RESPIRATION RATE: 16 BRPM | TEMPERATURE: 99.1 F | HEIGHT: 64 IN | WEIGHT: 127 LBS | HEART RATE: 57 BPM | DIASTOLIC BLOOD PRESSURE: 70 MMHG | SYSTOLIC BLOOD PRESSURE: 130 MMHG | OXYGEN SATURATION: 95 % | BODY MASS INDEX: 21.68 KG/M2

## 2018-12-04 DIAGNOSIS — Z86.39 HISTORY OF THYROID NODULE: ICD-10-CM

## 2018-12-04 DIAGNOSIS — I35.0 AORTIC VALVE STENOSIS, ETIOLOGY OF CARDIAC VALVE DISEASE UNSPECIFIED: ICD-10-CM

## 2018-12-04 DIAGNOSIS — F33.0 MDD (MAJOR DEPRESSIVE DISORDER), RECURRENT EPISODE, MILD (HCC): ICD-10-CM

## 2018-12-04 DIAGNOSIS — R53.83 FATIGUE, UNSPECIFIED TYPE: ICD-10-CM

## 2018-12-04 LAB
25(OH)D3 SERPL-MCNC: 16 NG/ML (ref 30–100)
ALBUMIN SERPL BCP-MCNC: 3.9 G/DL (ref 3.2–4.9)
ALBUMIN/GLOB SERPL: 1.3 G/DL
ALP SERPL-CCNC: 60 U/L (ref 30–99)
ALT SERPL-CCNC: 12 U/L (ref 2–50)
ANION GAP SERPL CALC-SCNC: 7 MMOL/L (ref 0–11.9)
AST SERPL-CCNC: 17 U/L (ref 12–45)
BASOPHILS # BLD AUTO: 0.7 % (ref 0–1.8)
BASOPHILS # BLD: 0.03 K/UL (ref 0–0.12)
BILIRUB SERPL-MCNC: 0.5 MG/DL (ref 0.1–1.5)
BUN SERPL-MCNC: 22 MG/DL (ref 8–22)
CALCIUM SERPL-MCNC: 9.4 MG/DL (ref 8.5–10.5)
CHLORIDE SERPL-SCNC: 101 MMOL/L (ref 96–112)
CO2 SERPL-SCNC: 28 MMOL/L (ref 20–33)
CREAT SERPL-MCNC: 0.98 MG/DL (ref 0.5–1.4)
EOSINOPHIL # BLD AUTO: 0.21 K/UL (ref 0–0.51)
EOSINOPHIL NFR BLD: 4.8 % (ref 0–6.9)
ERYTHROCYTE [DISTWIDTH] IN BLOOD BY AUTOMATED COUNT: 43.9 FL (ref 35.9–50)
FOLATE SERPL-MCNC: 14.8 NG/ML
GLOBULIN SER CALC-MCNC: 3.1 G/DL (ref 1.9–3.5)
GLUCOSE SERPL-MCNC: 99 MG/DL (ref 65–99)
HCT VFR BLD AUTO: 42 % (ref 37–47)
HGB BLD-MCNC: 14.4 G/DL (ref 12–16)
IMM GRANULOCYTES # BLD AUTO: 0 K/UL (ref 0–0.11)
IMM GRANULOCYTES NFR BLD AUTO: 0 % (ref 0–0.9)
IRON SATN MFR SERPL: 20 % (ref 15–55)
IRON SERPL-MCNC: 63 UG/DL (ref 40–170)
LYMPHOCYTES # BLD AUTO: 1.36 K/UL (ref 1–4.8)
LYMPHOCYTES NFR BLD: 31.2 % (ref 22–41)
MCH RBC QN AUTO: 29.9 PG (ref 27–33)
MCHC RBC AUTO-ENTMCNC: 34.3 G/DL (ref 33.6–35)
MCV RBC AUTO: 87.1 FL (ref 81.4–97.8)
MONOCYTES # BLD AUTO: 0.52 K/UL (ref 0–0.85)
MONOCYTES NFR BLD AUTO: 11.9 % (ref 0–13.4)
NEUTROPHILS # BLD AUTO: 2.24 K/UL (ref 2–7.15)
NEUTROPHILS NFR BLD: 51.4 % (ref 44–72)
NRBC # BLD AUTO: 0 K/UL
NRBC BLD-RTO: 0 /100 WBC
PLATELET # BLD AUTO: 152 K/UL (ref 164–446)
PMV BLD AUTO: 10.3 FL (ref 9–12.9)
POTASSIUM SERPL-SCNC: 4.3 MMOL/L (ref 3.6–5.5)
PROT SERPL-MCNC: 7 G/DL (ref 6–8.2)
RBC # BLD AUTO: 4.82 M/UL (ref 4.2–5.4)
SODIUM SERPL-SCNC: 136 MMOL/L (ref 135–145)
T3 SERPL-MCNC: 95.8 NG/DL (ref 60–181)
T4 FREE SERPL-MCNC: 0.93 NG/DL (ref 0.53–1.43)
TIBC SERPL-MCNC: 321 UG/DL (ref 250–450)
TSH SERPL DL<=0.005 MIU/L-ACNC: 1.21 UIU/ML (ref 0.38–5.33)
VIT B12 SERPL-MCNC: 272 PG/ML (ref 211–911)
WBC # BLD AUTO: 4.4 K/UL (ref 4.8–10.8)

## 2018-12-04 PROCEDURE — 99214 OFFICE O/P EST MOD 30 MIN: CPT | Performed by: FAMILY MEDICINE

## 2018-12-04 PROCEDURE — 84480 ASSAY TRIIODOTHYRONINE (T3): CPT

## 2018-12-04 PROCEDURE — 85025 COMPLETE CBC W/AUTO DIFF WBC: CPT

## 2018-12-04 PROCEDURE — 82306 VITAMIN D 25 HYDROXY: CPT

## 2018-12-04 PROCEDURE — 83550 IRON BINDING TEST: CPT

## 2018-12-04 PROCEDURE — 82607 VITAMIN B-12: CPT

## 2018-12-04 PROCEDURE — 84439 ASSAY OF FREE THYROXINE: CPT

## 2018-12-04 PROCEDURE — 36415 COLL VENOUS BLD VENIPUNCTURE: CPT

## 2018-12-04 PROCEDURE — 82746 ASSAY OF FOLIC ACID SERUM: CPT

## 2018-12-04 PROCEDURE — 83540 ASSAY OF IRON: CPT

## 2018-12-04 PROCEDURE — 84443 ASSAY THYROID STIM HORMONE: CPT

## 2018-12-04 PROCEDURE — 80053 COMPREHEN METABOLIC PANEL: CPT

## 2018-12-04 RX ORDER — FLUOXETINE HYDROCHLORIDE 20 MG/1
20 CAPSULE ORAL DAILY
Qty: 30 CAP | Refills: 3 | Status: SHIPPED | OUTPATIENT
Start: 2018-12-04 | End: 2019-05-06

## 2018-12-04 ASSESSMENT — ENCOUNTER SYMPTOMS
INSOMNIA: 1
ANOREXIA: 0
EYES NEGATIVE: 1
NEUROLOGICAL NEGATIVE: 1
DIZZINESS: 0
FATIGUE: 1
GASTROINTESTINAL NEGATIVE: 1
BLURRED VISION: 0
HEARTBURN: 0
RESPIRATORY NEGATIVE: 1
HEMOPTYSIS: 0
MYALGIAS: 0
FEVER: 0
MUSCULOSKELETAL NEGATIVE: 1
DEPRESSION: 1
BRUISES/BLEEDS EASILY: 0
DOUBLE VISION: 0
NERVOUS/ANXIOUS: 1
PALPITATIONS: 0
CHILLS: 0
TINGLING: 0
COUGH: 0
HEADACHES: 0
CARDIOVASCULAR NEGATIVE: 1
WEIGHT LOSS: 1
ABDOMINAL PAIN: 0
NAUSEA: 0

## 2018-12-04 NOTE — PROGRESS NOTES
Subjective:      Tamy Calvin is a 83 y.o. female who presents with Weight Loss; Other (crying a lot); and Difficulty Sleeping            1. Aortic valve stenosis, etiology of cardiac valve disease unspecified  F/u with cardiology no cp   - COMP METABOLIC PANEL; Future  - FREE THYROXINE; Future  - TRIIDOTHYRONINE; Future  - TSH; Future  - VITAMIN D,25 HYDROXY; Future  - CBC WITH DIFFERENTIAL; Future  - IRON/TOTAL IRON BIND; Future  - FOLATE; Future  - VITAMIN B12; Future    2. MDD (major depressive disorder), recurrent episode, mild (HCC)  Trouble with sleeping and mood  She is from [place] and does not speak english so other than her daughter she does not have anyone to talk to  Will try trial of ssri  - COMP METABOLIC PANEL; Future  - FREE THYROXINE; Future  - TRIIDOTHYRONINE; Future  - TSH; Future  - VITAMIN D,25 HYDROXY; Future  - CBC WITH DIFFERENTIAL; Future  - IRON/TOTAL IRON BIND; Future  - FOLATE; Future  - VITAMIN B12; Future  - FLUoxetine (PROZAC) 20 MG Cap; Take 1 Cap by mouth every day.  Dispense: 30 Cap; Refill: 3    3. Fatigue, unspecified type  Likely related to MDD  - COMP METABOLIC PANEL; Future  - FREE THYROXINE; Future  - TRIIDOTHYRONINE; Future  - TSH; Future  - VITAMIN D,25 HYDROXY; Future  - CBC WITH DIFFERENTIAL; Future  - IRON/TOTAL IRON BIND; Future  - FOLATE; Future  - VITAMIN B12; Future  - US-SOFT TISSUES OF HEAD - NECK; Future    4. History of thyroid nodule    - COMP METABOLIC PANEL; Future  - FREE THYROXINE; Future  - TRIIDOTHYRONINE; Future  - TSH; Future  - VITAMIN D,25 HYDROXY; Future  - CBC WITH DIFFERENTIAL; Future  - IRON/TOTAL IRON BIND; Future  - FOLATE; Future  - VITAMIN B12; Future  - US-SOFT TISSUES OF HEAD - NECK; Future    Past Medical History:  No date: Arrhythmia      Comment:  unknown type.  pt is on sotalol  6/25/2012: Bilateral cataracts  No date: Cataract  No date: Heart murmur  No date: Hypertension  No date: Ischemic heart disease  No date: Multiple  thyroid nodules      Comment:  reportedly had thyroid us in [place] 4/12 that was                stable.   No date: Osteoporosis  Past Surgical History:  No date: ABDOMINAL HYSTERECTOMY TOTAL  Smoking status: Never Smoker                                                              Smokeless tobacco: Never Used                      Alcohol use: No              Review of patient's family history indicates:  Problem: Cancer      Relation: Daughter       Age of Onset: (Not Specified)       Comment: breast cancer   Problem: Genetic      Relation: Maternal Grandmother       Age of Onset: (Not Specified)   Problem: Psychiatry      Relation: Daughter       Age of Onset: (Not Specified)       Current Outpatient Prescriptions: •  FLUoxetine (PROZAC) 20 MG Cap, Take 1 Cap by mouth every day., Disp: 30 Cap, Rfl: 3•  lisinopril-hydrochlorothiazide (PRINZIDE, ZESTORETIC) 20-12.5 MG per tablet, TAKE ONE (1) TABLET BY MOUTH EVERY DAY, Disp: 90 Tab, Rfl: 1•  betamethasone dipropionate (DIPROLENE) 0.05 % Cream, Apply 1 g to affected area(s) 2 times a day., Disp: 1 Tube, Rfl: 6•  carvedilol (COREG) 6.25 MG Tab, Take 1 Tab by mouth 2 times a day, with meals., Disp: 180 Tab, Rfl: 2•  amLODIPine (NORVASC) 2.5 MG Tab, TAKE ONE (1) TABLET BY MOUTH EVERY DAY, Disp: 90 Tab, Rfl: 1•  meloxicam (MOBIC) 7.5 MG Tab, TAKE ONE (1) TABLET BY MOUTH EVERY DAY, Disp: 90 Tab, Rfl: 0•  Fexofenadine HCl (ALLEGRA ALLERGY PO), Take  by mouth., Disp: , Rfl: •  hydrocortisone 2.5 % Cream topical cream, Apply sparingly to affected area bid, Disp: 1 Tube, Rfl: 0•  doxycycline (VIBRAMYCIN) 100 MG Tab, Take 1 Tab by mouth 2 times a day., Disp: 20 Tab, Rfl: 0•  alprazolam (XANAX) 0.25 MG Tab, Take 1 Tab by mouth at bedtime as needed. May need  For anxiety, Disp: 30 Tab, Rfl: 0•  MethylPREDNISolone (MEDROL DOSEPAK) 4 MG Tablet Therapy Pack, As dir, Disp: 1 Kit, Rfl: 0•  Loratadine (CLARITIN) 5 MG Chew Tab, Take  by mouth., Disp: , Rfl: •  Diclofenac Sodium 1 %  GEL, Apply 1 Application to skin as directed 3 times a day., Disp: 45 g, Rfl: 1•  aspirin EC (ECOTRIN) 81 MG TBEC, Take 81 mg by mouth every day., Disp: , Rfl:      Patient was instructed on the use of medications, either prescriptions or OTC and informed on when the appropriate follow up time period should be. In addition, patient was also instructed that should any acute worsening occur that they should notify this clinic asap or call 911.          Depression   This is a recurrent problem. The current episode started more than 1 month ago. The problem occurs constantly. The problem has been unchanged. Associated symptoms include fatigue. Pertinent negatives include no abdominal pain, anorexia, chest pain, chills, coughing, fever, headaches, myalgias, nausea or rash. The symptoms are aggravated by stress. She has tried rest for the symptoms. The treatment provided no relief.       Review of Systems   Constitutional: Positive for fatigue, malaise/fatigue and weight loss. Negative for chills and fever.   HENT: Negative.  Negative for hearing loss.    Eyes: Negative.  Negative for blurred vision and double vision.   Respiratory: Negative.  Negative for cough and hemoptysis.    Cardiovascular: Negative.  Negative for chest pain and palpitations.   Gastrointestinal: Negative.  Negative for abdominal pain, anorexia, heartburn and nausea.   Genitourinary: Negative.  Negative for dysuria.   Musculoskeletal: Negative.  Negative for myalgias.   Skin: Negative.  Negative for rash.   Neurological: Negative.  Negative for dizziness, tingling and headaches.   Endo/Heme/Allergies: Negative.  Does not bruise/bleed easily.   Psychiatric/Behavioral: Positive for depression. Negative for suicidal ideas. The patient is nervous/anxious and has insomnia.    All other systems reviewed and are negative.         Objective:     /70 (BP Location: Right arm, Patient Position: Sitting)   Pulse (!) 57   Temp 37.3 °C (99.1 °F)   Resp 16  "  Ht 1.626 m (5' 4\")   Wt 57.6 kg (127 lb)   SpO2 95%   BMI 21.80 kg/m²      Physical Exam   Constitutional: She is oriented to person, place, and time. She appears well-developed and well-nourished. No distress.   HENT:   Head: Normocephalic and atraumatic.   Mouth/Throat: Oropharynx is clear and moist. No oropharyngeal exudate.   Eyes: Pupils are equal, round, and reactive to light.   Cardiovascular: Normal rate, regular rhythm, normal heart sounds and intact distal pulses.  Exam reveals no gallop and no friction rub.    No murmur heard.  Pulmonary/Chest: Effort normal and breath sounds normal. No respiratory distress. She has no wheezes. She has no rales. She exhibits no tenderness.   Neurological: She is alert and oriented to person, place, and time.   Skin: She is not diaphoretic.   Psychiatric: Her behavior is normal. Judgment and thought content normal. She exhibits a depressed mood.   Nursing note and vitals reviewed.              Assessment/Plan:     1. Aortic valve stenosis, etiology of cardiac valve disease unspecified    - COMP METABOLIC PANEL; Future  - FREE THYROXINE; Future  - TRIIDOTHYRONINE; Future  - TSH; Future  - VITAMIN D,25 HYDROXY; Future  - CBC WITH DIFFERENTIAL; Future  - IRON/TOTAL IRON BIND; Future  - FOLATE; Future  - VITAMIN B12; Future    2. MDD (major depressive disorder), recurrent episode, mild (HCC)    - COMP METABOLIC PANEL; Future  - FREE THYROXINE; Future  - TRIIDOTHYRONINE; Future  - TSH; Future  - VITAMIN D,25 HYDROXY; Future  - CBC WITH DIFFERENTIAL; Future  - IRON/TOTAL IRON BIND; Future  - FOLATE; Future  - VITAMIN B12; Future  - FLUoxetine (PROZAC) 20 MG Cap; Take 1 Cap by mouth every day.  Dispense: 30 Cap; Refill: 3    3. Fatigue, unspecified type    - COMP METABOLIC PANEL; Future  - FREE THYROXINE; Future  - TRIIDOTHYRONINE; Future  - TSH; Future  - VITAMIN D,25 HYDROXY; Future  - CBC WITH DIFFERENTIAL; Future  - IRON/TOTAL IRON BIND; Future  - FOLATE; Future  - " VITAMIN B12; Future  - US-SOFT TISSUES OF HEAD - NECK; Future    4. History of thyroid nodule  - COMP METABOLIC PANEL; Future  - FREE THYROXINE; Future  - TRIIDOTHYRONINE; Future  - TSH; Future  - VITAMIN D,25 HYDROXY; Future  - CBC WITH DIFFERENTIAL; Future  - IRON/TOTAL IRON BIND; Future  - FOLATE; Future  - VITAMIN B12; Future  - US-SOFT TISSUES OF HEAD - NECK; Future

## 2018-12-05 ENCOUNTER — TELEPHONE (OUTPATIENT)
Dept: MEDICAL GROUP | Facility: PHYSICIAN GROUP | Age: 83
End: 2018-12-05

## 2018-12-05 NOTE — TELEPHONE ENCOUNTER
----- Message from Baltazar Hopkins M.D. sent at 12/5/2018  8:27 AM PST -----  Thyroid normal  Labs show patient is low on vitamin d, they needs to replace this with 2000 units per day otc

## 2018-12-05 NOTE — TELEPHONE ENCOUNTER
Called and spoke with the patients daughter and informed her of her mothers labs. And also informed her that the provider recommended 2000 units of a vitamin D supplement that she can get over the counter.

## 2019-02-27 DIAGNOSIS — I10 ESSENTIAL HYPERTENSION: ICD-10-CM

## 2019-02-27 RX ORDER — AMLODIPINE BESYLATE 2.5 MG/1
TABLET ORAL
Qty: 90 TAB | Refills: 1 | Status: SHIPPED | OUTPATIENT
Start: 2019-02-27 | End: 2019-08-30 | Stop reason: SDUPTHER

## 2019-05-06 ENCOUNTER — OFFICE VISIT (OUTPATIENT)
Dept: CARDIOLOGY | Facility: MEDICAL CENTER | Age: 84
End: 2019-05-06
Payer: COMMERCIAL

## 2019-05-06 VITALS
BODY MASS INDEX: 20.14 KG/M2 | SYSTOLIC BLOOD PRESSURE: 170 MMHG | HEIGHT: 64 IN | WEIGHT: 118 LBS | OXYGEN SATURATION: 94 % | DIASTOLIC BLOOD PRESSURE: 100 MMHG | HEART RATE: 68 BPM

## 2019-05-06 DIAGNOSIS — Z01.810 PRE-OPERATIVE CARDIOVASCULAR EXAMINATION: ICD-10-CM

## 2019-05-06 DIAGNOSIS — I35.0 AORTIC VALVE STENOSIS, ETIOLOGY OF CARDIAC VALVE DISEASE UNSPECIFIED: ICD-10-CM

## 2019-05-06 DIAGNOSIS — I10 ESSENTIAL HYPERTENSION: ICD-10-CM

## 2019-05-06 LAB — EKG IMPRESSION: NORMAL

## 2019-05-06 PROCEDURE — 99214 OFFICE O/P EST MOD 30 MIN: CPT | Performed by: NURSE PRACTITIONER

## 2019-05-06 PROCEDURE — 93000 ELECTROCARDIOGRAM COMPLETE: CPT | Performed by: INTERNAL MEDICINE

## 2019-05-06 ASSESSMENT — ENCOUNTER SYMPTOMS
CHILLS: 0
HEADACHES: 0
SHORTNESS OF BREATH: 0
ORTHOPNEA: 0
ABDOMINAL PAIN: 0
LOSS OF CONSCIOUSNESS: 0
FEVER: 0
PALPITATIONS: 0
DIZZINESS: 0
NERVOUS/ANXIOUS: 1
PND: 0
COUGH: 0
MYALGIAS: 0
BRUISES/BLEEDS EASILY: 0
NAUSEA: 0

## 2019-05-06 NOTE — PROGRESS NOTES
Chief Complaint   Patient presents with   • Follow-Up   • Aortic Stenosis   • HTN (Controlled)       Subjective:   Tamy Calvin is a 83 y.o. female who presents today for annual follow-up of severe aortic stenosis.    Tamy is an 83 year old female with history of severe AS, although asymptomatic and very active, hypertension and anxiety, previously followed by Dr. Ireland.    More recently, she has been losing weight, and stool studies were positive for blood; GI would like to do an EDG and colonoscopy in the hospital setting, but she needs cardiac pre-operative clearance.    From a cardiac standpoint, she is mostly stable. She is active, although not quite as active as previous. No chest pain, pressure or discomfort; no palpitations; breathing is stable with no orthopnea or PND; some lightheadedness, but no dizziness or syncope; no LE edema. BP has been higher lately. She just took her BP med right now, so BP is high.    Past Medical History:   Diagnosis Date   • Arrhythmia     unknown type.  pt is on sotalol   • Bilateral cataracts 6/25/2012   • Cataract    • Heart murmur    • Hypertension    • Ischemic heart disease    • Multiple thyroid nodules     reportedly had thyroid us in [place] 4/12 that was stable.    • Osteoporosis      Past Surgical History:   Procedure Laterality Date   • ABDOMINAL HYSTERECTOMY TOTAL       Family History   Problem Relation Age of Onset   • Cancer Daughter         breast cancer    • Genetic Maternal Grandmother    • Psychiatry Daughter      Social History     Social History   • Marital status:      Spouse name: N/A   • Number of children: N/A   • Years of education: N/A     Occupational History   • Not on file.     Social History Main Topics   • Smoking status: Never Smoker   • Smokeless tobacco: Never Used   • Alcohol use No   • Drug use: No   • Sexual activity: Not Currently     Other Topics Concern   • Not on file     Social History Narrative   • No narrative  on file     Allergies   Allergen Reactions   • Pcn [Penicillins]      Outpatient Encounter Prescriptions as of 5/6/2019   Medication Sig Dispense Refill   • amLODIPine (NORVASC) 2.5 MG Tab TAKE ONE (1) TABLET BY MOUTH EVERY DAY 90 Tab 1   • lisinopril-hydrochlorothiazide (PRINZIDE, ZESTORETIC) 20-12.5 MG per tablet TAKE ONE (1) TABLET BY MOUTH EVERY DAY 90 Tab 1   • betamethasone dipropionate (DIPROLENE) 0.05 % Cream Apply 1 g to affected area(s) 2 times a day. 1 Tube 6   • carvedilol (COREG) 6.25 MG Tab Take 1 Tab by mouth 2 times a day, with meals. 180 Tab 2   • Fexofenadine HCl (ALLEGRA ALLERGY PO) Take  by mouth.     • hydrocortisone 2.5 % Cream topical cream Apply sparingly to affected area bid 1 Tube 0   • Diclofenac Sodium 1 % GEL Apply 1 Application to skin as directed 3 times a day. 45 g 1   • aspirin EC (ECOTRIN) 81 MG TBEC Take 81 mg by mouth every day.     • [DISCONTINUED] FLUoxetine (PROZAC) 20 MG Cap Take 1 Cap by mouth every day. 30 Cap 3   • [DISCONTINUED] meloxicam (MOBIC) 7.5 MG Tab TAKE ONE (1) TABLET BY MOUTH EVERY DAY 90 Tab 0   • [DISCONTINUED] doxycycline (VIBRAMYCIN) 100 MG Tab Take 1 Tab by mouth 2 times a day. 20 Tab 0   • [DISCONTINUED] alprazolam (XANAX) 0.25 MG Tab Take 1 Tab by mouth at bedtime as needed. May need  For anxiety 30 Tab 0   • [DISCONTINUED] MethylPREDNISolone (MEDROL DOSEPAK) 4 MG Tablet Therapy Pack As dir 1 Kit 0   • [DISCONTINUED] Loratadine (CLARITIN) 5 MG Chew Tab Take  by mouth.       No facility-administered encounter medications on file as of 5/6/2019.      Review of Systems   Constitutional: Negative for chills and fever.   HENT: Negative for congestion.    Respiratory: Negative for cough and shortness of breath.    Cardiovascular: Negative for chest pain, palpitations, orthopnea, leg swelling and PND.   Gastrointestinal: Negative for abdominal pain and nausea.   Musculoskeletal: Negative for myalgias.   Skin: Negative for rash.   Neurological: Negative for  "dizziness, loss of consciousness and headaches.   Endo/Heme/Allergies: Does not bruise/bleed easily.   Psychiatric/Behavioral: The patient is nervous/anxious.         Objective:   BP (!) 170/100 (BP Location: Left arm, Patient Position: Sitting)   Pulse 68   Ht 1.626 m (5' 4\")   Wt 53.5 kg (118 lb)   SpO2 94%   BMI 20.25 kg/m²     Physical Exam   Constitutional: She is oriented to person, place, and time. She appears well-developed and well-nourished.   Looks younger than stated age.   HENT:   Head: Normocephalic.   Eyes: EOM are normal.   Neck: Normal range of motion. Neck supple. No JVD present.   Cardiovascular: Normal rate and regular rhythm.    Murmur heard.   Systolic murmur is present with a grade of 3/6   Murmur at RUSB.   Pulmonary/Chest: Effort normal and breath sounds normal. No respiratory distress. She has no wheezes. She has no rales.   Abdominal: Soft. Bowel sounds are normal. She exhibits no distension. There is no tenderness.   Musculoskeletal: Normal range of motion. She exhibits edema.   Trace edema to the ankles bilaterally.   Neurological: She is alert and oriented to person, place, and time.   Skin: Skin is warm and dry. No rash noted.   Psychiatric: She has a normal mood and affect.     EKG ordered and interpreted by me today shows sinus rhythm at 65bpm with LVH.    CONCLUSIONS OF ECHOCARDIOGRAM OF 6/19/2018:  Compared to the images of the prior study done on 09/08/17, no   significant change  Transvalvular gradients are - Peak: 76  mmHg, Mean: 43 mmHg.  Severe aortic stenosis.  Aortic valve area calculated from the continuity equation is .4 cm2    Vmax is   4.3 m/s.  19, trace aortic insufficiency.  Mild concentric left ventricular hypertrophy.  Normal regional wall motion.  Left ventricular ejection fraction is visually estimated to be 55%.  Mild tricuspid regurgitation.  Estimated right ventricular systolic pressure  is 30 mmHg.  Thickened mitral valve leaflets.  Mild mitral " regurgitation.    Lab Results   Component Value Date/Time    CHOLSTRLTOT 189 06/19/2018 08:41 AM     (H) 06/19/2018 08:41 AM    HDL 61 06/19/2018 08:41 AM    TRIGLYCERIDE 68 06/19/2018 08:41 AM       Lab Results   Component Value Date/Time    SODIUM 136 12/04/2018 11:40 AM    POTASSIUM 4.3 12/04/2018 11:40 AM    CHLORIDE 101 12/04/2018 11:40 AM    CO2 28 12/04/2018 11:40 AM    GLUCOSE 99 12/04/2018 11:40 AM    BUN 22 12/04/2018 11:40 AM    CREATININE 0.98 12/04/2018 11:40 AM     Lab Results   Component Value Date/Time    ALKPHOSPHAT 60 12/04/2018 11:40 AM    ASTSGOT 17 12/04/2018 11:40 AM    ALTSGPT 12 12/04/2018 11:40 AM    TBILIRUBIN 0.5 12/04/2018 11:40 AM        Assessment:     1. Aortic valve stenosis, etiology of cardiac valve disease unspecified  EKG    EC-ECHOCARDIOGRAM COMPLETE W/O CONT   2. Pre-operative cardiovascular examination  EKG   3. Essential hypertension         Medical Decision Making:  Today's Assessment / Status / Plan:     1. Severe aortic stenosis, remarkably asymptomatic. To repeat echocardiogram. Discussed risks with daughter, who is present with patient (Patient does not speak English). Briefly discussed TAVR too.    2. Hypertension, treated and elevated today. She just took her BP medication; she is to monitor BP at home.    Echo as above; we will discuss procedure risk after results. Same medications for now.    Collaborating MD: Rossana

## 2019-05-06 NOTE — LETTER
[facility]   [address]   [address]  [place] [address]  Phone: [phone]  Fax: [phone]              Tamy Calvin  1935    Encounter Date: 5/6/2019    DIDIER Leroy          PROGRESS NOTE:  Chief Complaint   Patient presents with   • Follow-Up   • Aortic Stenosis   • HTN (Controlled)       Subjective:   Tamy Calvin is a 83 y.o. female who presents today for annual follow-up of severe aortic stenosis.    Tamy is an 83 year old female with history of severe AS, although asymptomatic and very active, hypertension and anxiety, previously followed by Dr. Ireland.    More recently, she has been losing weight, and stool studies were positive for blood; GI would like to do an EDG and colonoscopy in the hospital setting, but she needs cardiac pre-operative clearance.    From a cardiac standpoint, she is mostly stable. She is active, although not quite as active as previous. No chest pain, pressure or discomfort; no palpitations; breathing is stable with no orthopnea or PND; some lightheadedness, but no dizziness or syncope; no LE edema. BP has been higher lately. She just took her BP med right now, so BP is high.    Past Medical History:   Diagnosis Date   • Arrhythmia     unknown type.  pt is on sotalol   • Bilateral cataracts 6/25/2012   • Cataract    • Heart murmur    • Hypertension    • Ischemic heart disease    • Multiple thyroid nodules     reportedly had thyroid us in [place] 4/12 that was stable.    • Osteoporosis      Past Surgical History:   Procedure Laterality Date   • ABDOMINAL HYSTERECTOMY TOTAL       Family History   Problem Relation Age of Onset   • Cancer Daughter         breast cancer    • Genetic Maternal Grandmother    • Psychiatry Daughter      Social History     Social History   • Marital status:      Spouse name: N/A   • Number of children: N/A   • Years of education: N/A     Occupational  History   • Not on file.     Social History Main Topics   • Smoking status: Never Smoker   • Smokeless tobacco: Never Used   • Alcohol use No   • Drug use: No   • Sexual activity: Not Currently     Other Topics Concern   • Not on file     Social History Narrative   • No narrative on file     Allergies   Allergen Reactions   • Pcn [Penicillins]      Outpatient Encounter Prescriptions as of 5/6/2019   Medication Sig Dispense Refill   • amLODIPine (NORVASC) 2.5 MG Tab TAKE ONE (1) TABLET BY MOUTH EVERY DAY 90 Tab 1   • lisinopril-hydrochlorothiazide (PRINZIDE, ZESTORETIC) 20-12.5 MG per tablet TAKE ONE (1) TABLET BY MOUTH EVERY DAY 90 Tab 1   • betamethasone dipropionate (DIPROLENE) 0.05 % Cream Apply 1 g to affected area(s) 2 times a day. 1 Tube 6   • carvedilol (COREG) 6.25 MG Tab Take 1 Tab by mouth 2 times a day, with meals. 180 Tab 2   • Fexofenadine HCl (ALLEGRA ALLERGY PO) Take  by mouth.     • hydrocortisone 2.5 % Cream topical cream Apply sparingly to affected area bid 1 Tube 0   • Diclofenac Sodium 1 % GEL Apply 1 Application to skin as directed 3 times a day. 45 g 1   • aspirin EC (ECOTRIN) 81 MG TBEC Take 81 mg by mouth every day.     • [DISCONTINUED] FLUoxetine (PROZAC) 20 MG Cap Take 1 Cap by mouth every day. 30 Cap 3   • [DISCONTINUED] meloxicam (MOBIC) 7.5 MG Tab TAKE ONE (1) TABLET BY MOUTH EVERY DAY 90 Tab 0   • [DISCONTINUED] doxycycline (VIBRAMYCIN) 100 MG Tab Take 1 Tab by mouth 2 times a day. 20 Tab 0   • [DISCONTINUED] alprazolam (XANAX) 0.25 MG Tab Take 1 Tab by mouth at bedtime as needed. May need  For anxiety 30 Tab 0   • [DISCONTINUED] MethylPREDNISolone (MEDROL DOSEPAK) 4 MG Tablet Therapy Pack As dir 1 Kit 0   • [DISCONTINUED] Loratadine (CLARITIN) 5 MG Chew Tab Take  by mouth.       No facility-administered encounter medications on file as of 5/6/2019.      Review of Systems   Constitutional: Negative for chills and fever.   HENT: Negative for congestion.    Respiratory: Negative for cough  "and shortness of breath.    Cardiovascular: Negative for chest pain, palpitations, orthopnea, leg swelling and PND.   Gastrointestinal: Negative for abdominal pain and nausea.   Musculoskeletal: Negative for myalgias.   Skin: Negative for rash.   Neurological: Negative for dizziness, loss of consciousness and headaches.   Endo/Heme/Allergies: Does not bruise/bleed easily.   Psychiatric/Behavioral: The patient is nervous/anxious.         Objective:   BP (!) 170/100 (BP Location: Left arm, Patient Position: Sitting)   Pulse 68   Ht 1.626 m (5' 4\")   Wt 53.5 kg (118 lb)   SpO2 94%   BMI 20.25 kg/m²      Physical Exam   Constitutional: She is oriented to person, place, and time. She appears well-developed and well-nourished.   Looks younger than stated age.   HENT:   Head: Normocephalic.   Eyes: EOM are normal.   Neck: Normal range of motion. Neck supple. No JVD present.   Cardiovascular: Normal rate and regular rhythm.    Murmur heard.   Systolic murmur is present with a grade of 3/6   Murmur at RUSB.   Pulmonary/Chest: Effort normal and breath sounds normal. No respiratory distress. She has no wheezes. She has no rales.   Abdominal: Soft. Bowel sounds are normal. She exhibits no distension. There is no tenderness.   Musculoskeletal: Normal range of motion. She exhibits edema.   Trace edema to the ankles bilaterally.   Neurological: She is alert and oriented to person, place, and time.   Skin: Skin is warm and dry. No rash noted.   Psychiatric: She has a normal mood and affect.     CONCLUSIONS OF ECHOCARDIOGRAM OF 6/19/2018:  Compared to the images of the prior study done on 09/08/17, no   significant change  Transvalvular gradients are - Peak: 76  mmHg, Mean: 43 mmHg.  Severe aortic stenosis.  Aortic valve area calculated from the continuity equation is .4 cm2    Vmax is   4.3 m/s.  19, trace aortic insufficiency.  Mild concentric left ventricular hypertrophy.  Normal regional wall motion.  Left ventricular " ejection fraction is visually estimated to be 55%.  Mild tricuspid regurgitation.  Estimated right ventricular systolic pressure  is 30 mmHg.  Thickened mitral valve leaflets.  Mild mitral regurgitation.    Lab Results   Component Value Date/Time    CHOLSTRLTOT 189 06/19/2018 08:41 AM     (H) 06/19/2018 08:41 AM    HDL 61 06/19/2018 08:41 AM    TRIGLYCERIDE 68 06/19/2018 08:41 AM       Lab Results   Component Value Date/Time    SODIUM 136 12/04/2018 11:40 AM    POTASSIUM 4.3 12/04/2018 11:40 AM    CHLORIDE 101 12/04/2018 11:40 AM    CO2 28 12/04/2018 11:40 AM    GLUCOSE 99 12/04/2018 11:40 AM    BUN 22 12/04/2018 11:40 AM    CREATININE 0.98 12/04/2018 11:40 AM     Lab Results   Component Value Date/Time    ALKPHOSPHAT 60 12/04/2018 11:40 AM    ASTSGOT 17 12/04/2018 11:40 AM    ALTSGPT 12 12/04/2018 11:40 AM    TBILIRUBIN 0.5 12/04/2018 11:40 AM        Assessment:     1. Aortic valve stenosis, etiology of cardiac valve disease unspecified  EKG    EC-ECHOCARDIOGRAM COMPLETE W/O CONT   2. Pre-operative cardiovascular examination  EKG   3. Essential hypertension         Medical Decision Making:  Today's Assessment / Status / Plan:     1. Severe aortic stenosis, remarkably asymptomatic. To repeat echocardiogram. Discussed risks with daughter, who is present with patient (Patient does not speak English). Briefly discussed TAVR too.    2. Hypertension, treated and elevated today. She just took her BP medication; she is to monitor BP at home.    Echo as above; we will discuss procedure risk after results. Same medications for now.    Collaborating MD: Rossana Weiss

## 2019-05-08 ENCOUNTER — ANCILLARY PROCEDURE (OUTPATIENT)
Dept: CARDIOLOGY | Facility: IMAGING CENTER | Age: 84
End: 2019-05-08
Attending: NURSE PRACTITIONER
Payer: COMMERCIAL

## 2019-05-08 DIAGNOSIS — I35.0 AORTIC VALVE STENOSIS, ETIOLOGY OF CARDIAC VALVE DISEASE UNSPECIFIED: ICD-10-CM

## 2019-05-08 LAB
LV EJECT FRACT  99904: 65
LV EJECT FRACT MOD 2C 99903: 71.19
LV EJECT FRACT MOD 4C 99902: 71.49
LV EJECT FRACT MOD BP 99901: 71.6

## 2019-05-08 PROCEDURE — 93306 TTE W/DOPPLER COMPLETE: CPT | Performed by: INTERNAL MEDICINE

## 2019-05-29 ENCOUNTER — TELEPHONE (OUTPATIENT)
Dept: CARDIOLOGY | Facility: MEDICAL CENTER | Age: 84
End: 2019-05-29

## 2019-05-29 DIAGNOSIS — R06.02 SHORTNESS OF BREATH: ICD-10-CM

## 2019-05-29 DIAGNOSIS — I35.0 SEVERE AORTIC STENOSIS: ICD-10-CM

## 2019-05-29 DIAGNOSIS — Z01.810 PRE-OPERATIVE CARDIOVASCULAR EXAMINATION: ICD-10-CM

## 2019-05-29 DIAGNOSIS — I10 ESSENTIAL HYPERTENSION: ICD-10-CM

## 2019-05-29 NOTE — TELEPHONE ENCOUNTER
Was the patient seen in the last year in this department? Yes    Does patient have an active prescription for medications requested? Yes    Received Request Via: Pharmacy     Last visit 12/4/2018  Last labs 12/4/2018

## 2019-05-29 NOTE — TELEPHONE ENCOUNTER
Left voice message regarding  Valve clinic apt 6/5 at 2pm with CTS. Encouraged return call to discuss such in detail. Awaiting return call.

## 2019-05-29 NOTE — TELEPHONE ENCOUNTER
Spoke with patient's daughter Poppy, as patient does not speak English. Poppy reports initially that patient denies sxs of critical AS and declines treatment at this time.     Spent some time discussing disease process with Poppy, and progression of disease and sxs, including that patients are encouraged to obtain treatment while they are still well/healthy enough because there is a small population of patients that do get turned down for treatment due to being too ill/frail, and the risks outweighing the benefits.    Poppy reports that the patient did see a [facility] in [place] that strongly encouraged patient to undergo treatment, however the patient did not feel comfortable with him. Assured Poppy that she and patient will have opportunity to consult with our heart team to discuss disease and treatment options. The patient has the autonomy to proceed with treatment ONLY if those are her wishes, our team only presents her with options available to her.     Reviewed that we do have CTS consult arranged 6/5 at  2pm and will confirm such, just incase patient wants to proceed with consult, at this time. Poppy states understanding and reports that she will be discuss with patient and return call once she know more about what the patient would like to do. Poppy states she is very thankful for time spent explaining disease and severity, as they had not understood how serious this heart disease is until our conversation. Encouraged return call with any further questions and/or concerns.

## 2019-05-29 NOTE — TELEPHONE ENCOUNTER
"Poppy returned call and left voice message stating that she has spoken with patient and \"no matter what I say, no matter what I try, she refuses to accept treatment\". Poppy further states that the patient declines treatment and unfortunately will need to cancel referral to valve program and all apt associated with such. Poppy further states she is thankful for information provided during earlier conversation, but unfortunately patient does not want treatment.     Heart team and referring provider notified of such.     "

## 2019-05-30 DIAGNOSIS — I10 ESSENTIAL HYPERTENSION: ICD-10-CM

## 2019-05-30 RX ORDER — LISINOPRIL AND HYDROCHLOROTHIAZIDE 20; 12.5 MG/1; MG/1
TABLET ORAL
Qty: 90 TAB | Refills: 1 | Status: SHIPPED | OUTPATIENT
Start: 2019-05-30 | End: 2019-12-04 | Stop reason: SDUPTHER

## 2019-05-30 RX ORDER — CARVEDILOL 6.25 MG/1
6.25 TABLET ORAL 2 TIMES DAILY WITH MEALS
Qty: 180 TAB | Refills: 3 | Status: SHIPPED | OUTPATIENT
Start: 2019-05-30 | End: 2019-12-04 | Stop reason: SDUPTHER

## 2019-08-30 DIAGNOSIS — I10 ESSENTIAL HYPERTENSION: ICD-10-CM

## 2019-08-30 RX ORDER — AMLODIPINE BESYLATE 2.5 MG/1
TABLET ORAL
Qty: 90 TAB | Refills: 2 | Status: CANCELLED | OUTPATIENT
Start: 2019-08-30

## 2019-08-30 RX ORDER — AMLODIPINE BESYLATE 2.5 MG/1
2.5 TABLET ORAL DAILY
Qty: 90 TAB | Refills: 3 | Status: SHIPPED | OUTPATIENT
Start: 2019-08-30 | End: 2019-12-04 | Stop reason: SDUPTHER

## 2019-12-04 ENCOUNTER — OFFICE VISIT (OUTPATIENT)
Dept: MEDICAL GROUP | Facility: PHYSICIAN GROUP | Age: 84
End: 2019-12-04
Payer: COMMERCIAL

## 2019-12-04 VITALS
RESPIRATION RATE: 16 BRPM | SYSTOLIC BLOOD PRESSURE: 142 MMHG | DIASTOLIC BLOOD PRESSURE: 90 MMHG | WEIGHT: 120 LBS | HEART RATE: 82 BPM | BODY MASS INDEX: 20.49 KG/M2 | TEMPERATURE: 98.9 F | HEIGHT: 64 IN | OXYGEN SATURATION: 98 %

## 2019-12-04 DIAGNOSIS — I10 ESSENTIAL HYPERTENSION: ICD-10-CM

## 2019-12-04 DIAGNOSIS — I35.0 AORTIC VALVE STENOSIS, ETIOLOGY OF CARDIAC VALVE DISEASE UNSPECIFIED: ICD-10-CM

## 2019-12-04 DIAGNOSIS — F33.0 MDD (MAJOR DEPRESSIVE DISORDER), RECURRENT EPISODE, MILD (HCC): ICD-10-CM

## 2019-12-04 DIAGNOSIS — H91.93 BILATERAL HEARING LOSS, UNSPECIFIED HEARING LOSS TYPE: ICD-10-CM

## 2019-12-04 PROCEDURE — 99214 OFFICE O/P EST MOD 30 MIN: CPT | Performed by: FAMILY MEDICINE

## 2019-12-04 RX ORDER — AMLODIPINE BESYLATE 2.5 MG/1
2.5 TABLET ORAL DAILY
Qty: 90 TAB | Refills: 3 | Status: SHIPPED | OUTPATIENT
Start: 2019-12-04 | End: 2020-12-09 | Stop reason: SDUPTHER

## 2019-12-04 RX ORDER — LISINOPRIL AND HYDROCHLOROTHIAZIDE 20; 12.5 MG/1; MG/1
TABLET ORAL
Qty: 90 TAB | Refills: 1 | Status: SHIPPED | OUTPATIENT
Start: 2019-12-04 | End: 2020-06-02

## 2019-12-04 RX ORDER — CARVEDILOL 6.25 MG/1
6.25 TABLET ORAL 2 TIMES DAILY WITH MEALS
Qty: 180 TAB | Refills: 3 | Status: SHIPPED | OUTPATIENT
Start: 2019-12-04 | End: 2020-12-09 | Stop reason: SDUPTHER

## 2019-12-04 ASSESSMENT — PATIENT HEALTH QUESTIONNAIRE - PHQ9: CLINICAL INTERPRETATION OF PHQ2 SCORE: 0

## 2019-12-04 ASSESSMENT — ENCOUNTER SYMPTOMS
EYES NEGATIVE: 1
PSYCHIATRIC NEGATIVE: 1
MUSCULOSKELETAL NEGATIVE: 1
RESPIRATORY NEGATIVE: 1
CHILLS: 0
FEVER: 0
BLURRED VISION: 0
NAUSEA: 0
CONSTITUTIONAL NEGATIVE: 1
TINGLING: 0
BRUISES/BLEEDS EASILY: 0
DIZZINESS: 0
COUGH: 0
MYALGIAS: 0
HEADACHES: 0
NEUROLOGICAL NEGATIVE: 1
DEPRESSION: 0
DOUBLE VISION: 0
HEARTBURN: 0
HEMOPTYSIS: 0
PALPITATIONS: 0
CARDIOVASCULAR NEGATIVE: 1
GASTROINTESTINAL NEGATIVE: 1

## 2019-12-04 NOTE — PROGRESS NOTES
Subjective:      Tamy Calvin is a 84 y.o. female who presents with Medication Refill (lisinopril )            1. Essential hypertension  Currently treated for HTN, taking meds with no CP or sob, monitors bp at home periodically. controlled    - lisinopril-hydrochlorothiazide (PRINZIDE) 20-12.5 MG per tablet; TAKE ONE (1) TABLET BY MOUTH EVERY DAY  Dispense: 90 Tab; Refill: 1  - carvedilol (COREG) 6.25 MG Tab; Take 1 Tab by mouth 2 times a day, with meals.  Dispense: 180 Tab; Refill: 3  - amLODIPine (NORVASC) 2.5 MG Tab; Take 1 Tab by mouth every day.  Dispense: 90 Tab; Refill: 3    2. MDD (major depressive disorder), recurrent episode, mild (HCC)    The patient's current medical issue is well controlled on the current therapy with no new symptoms or worsening      3. Bilateral hearing loss, unspecified hearing loss type  [facility] and trouble hearing voices will get eval  - REFERRAL TO AUDIOLOGY    4. Aortic valve stenosis, etiology of cardiac valve disease unspecified  Seen by cardiology and advised to have it fixed but patient declines due to lack of sx yet, will monitor    Past Medical History:  05/2019: Aortic stenosis      Comment:  Echocardiogram with normal LV size, mild concentric LVH,               LVEF 65%. Critical AS (NEWTON 0.5cm2, Vmax 4.5m/s, peak                81mmHg, mean 60mmHg), mild TR. RVSP 35mmHg.  No date: Bilateral cataracts  No date: Cataract  No date: Heart murmur  No date: Hypertension  No date: Ischemic heart disease  No date: Multiple thyroid nodules      Comment:  reportedly had thyroid us in [place] 4/12 that was                stable.   No date: Osteoporosis  Past Surgical History:  No date: ABDOMINAL HYSTERECTOMY TOTAL  Social History    Tobacco Use      Smoking status: Never Smoker      Smokeless tobacco: Never Used    Alcohol use: No    Drug use: No    Review of patient's family history indicates:  Problem: Cancer      Relation: Daughter          Age of Onset: (Not Specified)           Comment: breast cancer   Problem: Genetic Disorder      Relation: Maternal Grandmother          Age of Onset: (Not Specified)  Problem: Psychiatric Illness      Relation: Daughter          Age of Onset: (Not Specified)      Current Outpatient Medications: •  lisinopril-hydrochlorothiazide (PRINZIDE) 20-12.5 MG per tablet, TAKE ONE (1) TABLET BY MOUTH EVERY DAY, Disp: 90 Tab, Rfl: 1•  carvedilol (COREG) 6.25 MG Tab, Take 1 Tab by mouth 2 times a day, with meals., Disp: 180 Tab, Rfl: 3•  amLODIPine (NORVASC) 2.5 MG Tab, Take 1 Tab by mouth every day., Disp: 90 Tab, Rfl: 3•  betamethasone dipropionate (DIPROLENE) 0.05 % Cream, Apply 1 g to affected area(s) 2 times a day., Disp: 1 Tube, Rfl: 6•  Fexofenadine HCl (ALLEGRA ALLERGY PO), Take  by mouth., Disp: , Rfl: •  hydrocortisone 2.5 % Cream topical cream, Apply sparingly to affected area bid, Disp: 1 Tube, Rfl: 0•  Diclofenac Sodium 1 % GEL, Apply 1 Application to skin as directed 3 times a day., Disp: 45 g, Rfl: 1•  aspirin EC (ECOTRIN) 81 MG TBEC, Take 81 mg by mouth every day., Disp: , Rfl:      Patient was instructed on the use of medications, either prescriptions or OTC and informed on when the appropriate follow up time period should be. In addition, patient was also instructed that should any acute worsening occur that they should notify this clinic asap or call 911.          Review of Systems   Constitutional: Negative.  Negative for chills and fever.   HENT: Positive for hearing loss.    Eyes: Negative.  Negative for blurred vision and double vision.   Respiratory: Negative.  Negative for cough and hemoptysis.    Cardiovascular: Negative.  Negative for chest pain and palpitations.   Gastrointestinal: Negative.  Negative for heartburn and nausea.   Genitourinary: Negative.  Negative for dysuria.   Musculoskeletal: Negative.  Negative for myalgias.   Skin: Negative.  Negative for rash.   Neurological: Negative.  Negative for dizziness, tingling and  "headaches.   Endo/Heme/Allergies: Negative.  Does not bruise/bleed easily.   Psychiatric/Behavioral: Negative.  Negative for depression and suicidal ideas.   All other systems reviewed and are negative.         Objective:     /90   Pulse 82   Temp 37.2 °C (98.9 °F) (Temporal)   Resp 16   Ht 1.626 m (5' 4\")   Wt 54.4 kg (120 lb)   SpO2 98%   BMI 20.60 kg/m²      Physical Exam  Vitals signs and nursing note reviewed.   Constitutional:       General: She is not in acute distress.     Appearance: She is well-developed. She is not diaphoretic.   HENT:      Head: Normocephalic and atraumatic.      Mouth/Throat:      Pharynx: No oropharyngeal exudate.   Eyes:      Pupils: Pupils are equal, round, and reactive to light.   Cardiovascular:      Rate and Rhythm: Normal rate and regular rhythm.      Heart sounds: Murmur present. Systolic murmur present with a grade of 2/6. No friction rub. No gallop.    Pulmonary:      Effort: Pulmonary effort is normal. No respiratory distress.      Breath sounds: Normal breath sounds. No wheezing or rales.   Chest:      Chest wall: No tenderness.   Neurological:      Mental Status: She is alert and oriented to person, place, and time.   Psychiatric:         Behavior: Behavior normal.         Thought Content: Thought content normal.         Judgment: Judgment normal.                 Assessment/Plan:       1. Essential hypertension    - lisinopril-hydrochlorothiazide (PRINZIDE) 20-12.5 MG per tablet; TAKE ONE (1) TABLET BY MOUTH EVERY DAY  Dispense: 90 Tab; Refill: 1  - carvedilol (COREG) 6.25 MG Tab; Take 1 Tab by mouth 2 times a day, with meals.  Dispense: 180 Tab; Refill: 3  - amLODIPine (NORVASC) 2.5 MG Tab; Take 1 Tab by mouth every day.  Dispense: 90 Tab; Refill: 3    2. MDD (major depressive disorder), recurrent episode, mild (HCC)      3. Bilateral hearing loss, unspecified hearing loss type    - REFERRAL TO AUDIOLOGY    4. Aortic valve stenosis, etiology of cardiac valve " disease unspecified

## 2020-02-11 DIAGNOSIS — L20.81 ATOPIC NEURODERMATITIS: ICD-10-CM

## 2020-02-11 RX ORDER — BETAMETHASONE DIPROPIONATE 0.5 MG/G
1 CREAM TOPICAL 2 TIMES DAILY
Qty: 45 G | Refills: 6 | Status: SHIPPED | OUTPATIENT
Start: 2020-02-11 | End: 2021-05-17

## 2020-06-02 DIAGNOSIS — I10 ESSENTIAL HYPERTENSION: ICD-10-CM

## 2020-06-02 RX ORDER — LISINOPRIL AND HYDROCHLOROTHIAZIDE 20; 12.5 MG/1; MG/1
TABLET ORAL
Qty: 90 TAB | Refills: 1 | Status: SHIPPED | OUTPATIENT
Start: 2020-06-02 | End: 2020-12-09 | Stop reason: SDUPTHER

## 2020-12-09 ENCOUNTER — OFFICE VISIT (OUTPATIENT)
Dept: MEDICAL GROUP | Facility: PHYSICIAN GROUP | Age: 85
End: 2020-12-09
Payer: COMMERCIAL

## 2020-12-09 VITALS
BODY MASS INDEX: 19.84 KG/M2 | OXYGEN SATURATION: 97 % | DIASTOLIC BLOOD PRESSURE: 88 MMHG | TEMPERATURE: 97.7 F | HEART RATE: 57 BPM | WEIGHT: 115.6 LBS | SYSTOLIC BLOOD PRESSURE: 138 MMHG | RESPIRATION RATE: 16 BRPM

## 2020-12-09 DIAGNOSIS — I10 ESSENTIAL HYPERTENSION: ICD-10-CM

## 2020-12-09 DIAGNOSIS — I35.0 NONRHEUMATIC AORTIC VALVE STENOSIS: ICD-10-CM

## 2020-12-09 DIAGNOSIS — I25.9 ISCHEMIC HEART DISEASE: ICD-10-CM

## 2020-12-09 PROCEDURE — 99214 OFFICE O/P EST MOD 30 MIN: CPT | Performed by: FAMILY MEDICINE

## 2020-12-09 RX ORDER — AMLODIPINE BESYLATE 2.5 MG/1
2.5 TABLET ORAL DAILY
Qty: 90 TAB | Refills: 3 | Status: SHIPPED | OUTPATIENT
Start: 2020-12-09 | End: 2021-12-03

## 2020-12-09 RX ORDER — LISINOPRIL AND HYDROCHLOROTHIAZIDE 20; 12.5 MG/1; MG/1
TABLET ORAL
Qty: 90 TAB | Refills: 1 | Status: SHIPPED | OUTPATIENT
Start: 2020-12-09 | End: 2021-06-08

## 2020-12-09 RX ORDER — CARVEDILOL 6.25 MG/1
6.25 TABLET ORAL 2 TIMES DAILY WITH MEALS
Qty: 180 TAB | Refills: 3 | Status: SHIPPED | OUTPATIENT
Start: 2020-12-09 | End: 2021-12-03

## 2020-12-09 ASSESSMENT — ENCOUNTER SYMPTOMS
BLURRED VISION: 0
DOUBLE VISION: 0
CARDIOVASCULAR NEGATIVE: 1
MYALGIAS: 0
EYES NEGATIVE: 1
TINGLING: 0
MUSCULOSKELETAL NEGATIVE: 1
HEMOPTYSIS: 0
RESPIRATORY NEGATIVE: 1
DIZZINESS: 0
CHILLS: 0
GASTROINTESTINAL NEGATIVE: 1
PSYCHIATRIC NEGATIVE: 1
HEARTBURN: 0
COUGH: 0
FEVER: 0
NEUROLOGICAL NEGATIVE: 1
DEPRESSION: 0
NAUSEA: 0
PALPITATIONS: 0
HEADACHES: 0
BRUISES/BLEEDS EASILY: 0
CONSTITUTIONAL NEGATIVE: 1

## 2020-12-09 ASSESSMENT — PATIENT HEALTH QUESTIONNAIRE - PHQ9: CLINICAL INTERPRETATION OF PHQ2 SCORE: 0

## 2020-12-10 NOTE — PROGRESS NOTES
Subjective:      Tamy Calvin is a 85 y.o. female who presents with Medication Refill            1. Essential hypertension  Currently treated for HTN, taking meds with no CP or sob, monitors bp at home periodically. controlled    - lisinopril-hydrochlorothiazide (PRINZIDE) 20-12.5 MG per tablet; TAKE ONE TABLET BY MOUTH EVERY DAY  Dispense: 90 Tab; Refill: 1  - amLODIPine (NORVASC) 2.5 MG Tab; Take 1 Tab by mouth every day.  Dispense: 90 Tab; Refill: 3  - carvedilol (COREG) 6.25 MG Tab; Take 1 Tab by mouth 2 times a day, with meals.  Dispense: 180 Tab; Refill: 3    2. Nonrheumatic aortic valve stenosis  Seen by cardiology and refuses treatment  Able to walk 2 km per day with no sob    3. Ischemic heart disease  stable    Past Medical History:  05/2019: Aortic stenosis      Comment:  Echocardiogram with normal LV size, mild concentric LVH,               LVEF 65%. Critical AS (NEWTON 0.5cm2, Vmax 4.5m/s, peak                81mmHg, mean 60mmHg), mild TR. RVSP 35mmHg.  No date: Bilateral cataracts  No date: Cataract  No date: Heart murmur  No date: Hypertension  No date: Ischemic heart disease  No date: Multiple thyroid nodules      Comment:  reportedly had thyroid us in [place] 4/12 that was                stable.   No date: Osteoporosis  Past Surgical History:  No date: ABDOMINAL HYSTERECTOMY TOTAL  Social History    Tobacco Use      Smoking status: Never Smoker      Smokeless tobacco: Never Used    Alcohol use: No    Drug use: No    Review of patient's family history indicates:  Problem: Cancer      Relation: Daughter          Age of Onset: (Not Specified)          Comment: breast cancer   Problem: Genetic Disorder      Relation: Maternal Grandmother          Age of Onset: (Not Specified)  Problem: Psychiatric Illness      Relation: Daughter          Age of Onset: (Not Specified)      Current Outpatient Medications: •  lisinopril-hydrochlorothiazide (PRINZIDE) 20-12.5 MG per tablet, TAKE ONE TABLET BY MOUTH  EVERY DAY, Disp: 90 Tab, Rfl: 1•  amLODIPine (NORVASC) 2.5 MG Tab, Take 1 Tab by mouth every day., Disp: 90 Tab, Rfl: 3•  carvedilol (COREG) 6.25 MG Tab, Take 1 Tab by mouth 2 times a day, with meals., Disp: 180 Tab, Rfl: 3•  betamethasone dipropionate (DIPROLENE) 0.05 % Cream, Apply 1 g to affected area(s) 2 Times a Day., Disp: 45 g, Rfl: 6•  Fexofenadine HCl (ALLEGRA ALLERGY PO), Take  by mouth., Disp: , Rfl: •  hydrocortisone 2.5 % Cream topical cream, Apply sparingly to affected area bid, Disp: 1 Tube, Rfl: 0•  aspirin EC (ECOTRIN) 81 MG TBEC, Take 81 mg by mouth every day., Disp: , Rfl:  •  Diclofenac Sodium 1 % GEL, Apply 1 Application to skin as directed 3 times a day. (Patient not taking: Reported on 12/9/2020), Disp: 45 g, Rfl: 1    Patient was instructed on the use of medications, either prescriptions or OTC and informed on when the appropriate follow up time period should be. In addition, patient was also instructed that should any acute worsening occur that they should notify this clinic asap or call 911.          Review of Systems   Constitutional: Negative.  Negative for chills and fever.   HENT: Negative.  Negative for hearing loss.    Eyes: Negative.  Negative for blurred vision and double vision.   Respiratory: Negative.  Negative for cough and hemoptysis.    Cardiovascular: Negative.  Negative for chest pain and palpitations.   Gastrointestinal: Negative.  Negative for heartburn and nausea.   Genitourinary: Negative.  Negative for dysuria.   Musculoskeletal: Negative.  Negative for myalgias.   Skin: Negative.  Negative for rash.   Neurological: Negative.  Negative for dizziness, tingling and headaches.   Endo/Heme/Allergies: Negative.  Does not bruise/bleed easily.   Psychiatric/Behavioral: Negative.  Negative for depression and suicidal ideas.   All other systems reviewed and are negative.         Objective:     /88 (BP Location: Left arm, Patient Position: Sitting, BP Cuff Size: Adult)    Pulse (!) 57   Temp 36.5 °C (97.7 °F) (Temporal)   Resp 16   Wt 52.4 kg (115 lb 9.6 oz)   SpO2 97%   BMI 19.84 kg/m²      Physical Exam  Vitals signs and nursing note reviewed.   Constitutional:       General: She is not in acute distress.     Appearance: She is well-developed. She is not diaphoretic.   HENT:      Head: Normocephalic and atraumatic.      Mouth/Throat:      Pharynx: No oropharyngeal exudate.   Eyes:      Pupils: Pupils are equal, round, and reactive to light.   Cardiovascular:      Rate and Rhythm: Normal rate and regular rhythm.      Heart sounds: Murmur present. Systolic murmur present with a grade of 2/6. No friction rub. No gallop.    Pulmonary:      Effort: Pulmonary effort is normal. No respiratory distress.      Breath sounds: Normal breath sounds. No wheezing or rales.   Chest:      Chest wall: No tenderness.   Neurological:      Mental Status: She is alert and oriented to person, place, and time.   Psychiatric:         Behavior: Behavior normal.         Thought Content: Thought content normal.         Judgment: Judgment normal.                 Assessment/Plan:        1. Essential hypertension    - lisinopril-hydrochlorothiazide (PRINZIDE) 20-12.5 MG per tablet; TAKE ONE TABLET BY MOUTH EVERY DAY  Dispense: 90 Tab; Refill: 1  - amLODIPine (NORVASC) 2.5 MG Tab; Take 1 Tab by mouth every day.  Dispense: 90 Tab; Refill: 3  - carvedilol (COREG) 6.25 MG Tab; Take 1 Tab by mouth 2 times a day, with meals.  Dispense: 180 Tab; Refill: 3    2. Nonrheumatic aortic valve stenosis      3. Ischemic heart disease

## 2021-01-11 DIAGNOSIS — Z23 NEED FOR VACCINATION: ICD-10-CM

## 2021-05-16 DIAGNOSIS — L20.81 ATOPIC NEURODERMATITIS: ICD-10-CM

## 2021-05-17 RX ORDER — BETAMETHASONE DIPROPIONATE 0.5 MG/G
CREAM TOPICAL
Qty: 45 EACH | Refills: 6 | Status: SHIPPED | OUTPATIENT
Start: 2021-05-17 | End: 2022-03-23

## 2021-06-08 DIAGNOSIS — I10 ESSENTIAL HYPERTENSION: ICD-10-CM

## 2021-06-08 RX ORDER — LISINOPRIL AND HYDROCHLOROTHIAZIDE 20; 12.5 MG/1; MG/1
TABLET ORAL
Qty: 90 TABLET | Refills: 1 | Status: SHIPPED | OUTPATIENT
Start: 2021-06-08 | End: 2021-12-03

## 2021-12-02 DIAGNOSIS — I10 ESSENTIAL HYPERTENSION: ICD-10-CM

## 2021-12-03 RX ORDER — LISINOPRIL AND HYDROCHLOROTHIAZIDE 20; 12.5 MG/1; MG/1
TABLET ORAL
Qty: 90 TABLET | Refills: 1 | Status: SHIPPED | OUTPATIENT
Start: 2021-12-03 | End: 2022-06-08

## 2021-12-03 RX ORDER — AMLODIPINE BESYLATE 2.5 MG/1
TABLET ORAL
Qty: 90 TABLET | Refills: 3 | Status: SHIPPED | OUTPATIENT
Start: 2021-12-03 | End: 2022-11-22 | Stop reason: SDUPTHER

## 2021-12-03 RX ORDER — CARVEDILOL 6.25 MG/1
TABLET ORAL
Qty: 180 TABLET | Refills: 3 | Status: SHIPPED | OUTPATIENT
Start: 2021-12-03 | End: 2022-11-22 | Stop reason: SDUPTHER

## 2021-12-06 ENCOUNTER — APPOINTMENT (OUTPATIENT)
Dept: MEDICAL GROUP | Facility: PHYSICIAN GROUP | Age: 86
End: 2021-12-06
Payer: COMMERCIAL

## 2022-03-23 ENCOUNTER — OFFICE VISIT (OUTPATIENT)
Dept: MEDICAL GROUP | Facility: PHYSICIAN GROUP | Age: 87
End: 2022-03-23
Payer: COMMERCIAL

## 2022-03-23 VITALS
SYSTOLIC BLOOD PRESSURE: 140 MMHG | RESPIRATION RATE: 12 BRPM | DIASTOLIC BLOOD PRESSURE: 78 MMHG | WEIGHT: 112.8 LBS | TEMPERATURE: 99 F | OXYGEN SATURATION: 97 % | HEIGHT: 64 IN | BODY MASS INDEX: 19.26 KG/M2 | HEART RATE: 62 BPM

## 2022-03-23 DIAGNOSIS — M54.2 NECK PAIN: ICD-10-CM

## 2022-03-23 DIAGNOSIS — I10 ESSENTIAL HYPERTENSION: ICD-10-CM

## 2022-03-23 DIAGNOSIS — L30.9 DERMATITIS: ICD-10-CM

## 2022-03-23 DIAGNOSIS — I35.0 NONRHEUMATIC AORTIC VALVE STENOSIS: ICD-10-CM

## 2022-03-23 PROCEDURE — 99214 OFFICE O/P EST MOD 30 MIN: CPT | Performed by: FAMILY MEDICINE

## 2022-03-23 RX ORDER — MELOXICAM 7.5 MG/1
7.5 TABLET ORAL DAILY
Qty: 30 TABLET | Refills: 3 | Status: SHIPPED | OUTPATIENT
Start: 2022-03-23

## 2022-03-23 RX ORDER — TRIAMCINOLONE ACETONIDE 40 MG/ML
40 INJECTION, SUSPENSION INTRA-ARTICULAR; INTRAMUSCULAR ONCE
Status: COMPLETED | OUTPATIENT
Start: 2022-03-23 | End: 2022-03-23

## 2022-03-23 RX ORDER — TRIAMCINOLONE ACETONIDE 1 MG/G
1 OINTMENT TOPICAL 2 TIMES DAILY
Qty: 80 G | Refills: 4 | Status: SHIPPED | OUTPATIENT
Start: 2022-03-23 | End: 2022-11-22 | Stop reason: SDUPTHER

## 2022-03-23 RX ADMIN — TRIAMCINOLONE ACETONIDE 40 MG: 40 INJECTION, SUSPENSION INTRA-ARTICULAR; INTRAMUSCULAR at 10:59

## 2022-03-23 ASSESSMENT — ENCOUNTER SYMPTOMS
COUGH: 0
CONSTITUTIONAL NEGATIVE: 1
HEARTBURN: 0
RESPIRATORY NEGATIVE: 1
NEUROLOGICAL NEGATIVE: 1
MYALGIAS: 0
DIZZINESS: 0
CHILLS: 0
TINGLING: 0
EYES NEGATIVE: 1
PSYCHIATRIC NEGATIVE: 1
BRUISES/BLEEDS EASILY: 0
HEADACHES: 0
DEPRESSION: 0
DOUBLE VISION: 0
PALPITATIONS: 0
CARDIOVASCULAR NEGATIVE: 1
NAUSEA: 0
NECK PAIN: 1
FEVER: 0
HEMOPTYSIS: 0
GASTROINTESTINAL NEGATIVE: 1
BLURRED VISION: 0

## 2022-03-23 NOTE — PROGRESS NOTES
Subjective     Tamy Clavin is a 86 y.o. female who presents with Follow-Up (Skin irritation/upper body/dry/painful)            1. Dermatitis  Dry skin with itching on top of body advised on amlactin lotion otc in addition to below   triamcinolone acetonide (KENALOG-40) injection 40 mg   triamcinolone acetonide (KENALOG) 0.1 % Ointment; Apply 1 g topically 2 times a day.  Dispense: 80 g; Refill: 4   Comp Metabolic Panel; Future   FREE THYROXINE; Future   TRIIDOTHYRONINE; Future   Lipid Profile; Future   CBC WITHOUT DIFFERENTIAL; Future    2. Nonrheumatic aortic valve stenosis  The patient's current medical issue is well controlled on the current therapy with no new symptoms or worsening  Declines intervention   Comp Metabolic Panel; Future   FREE THYROXINE; Future   TRIIDOTHYRONINE; Future   Lipid Profile; Future   CBC WITHOUT DIFFERENTIAL; Future    3. Essential hypertension  Currently treated for HTN, taking meds with no CP or sob, monitors bp at home periodically. controlled     Comp Metabolic Panel; Future   FREE THYROXINE; Future   TRIIDOTHYRONINE; Future   Lipid Profile; Future   CBC WITHOUT DIFFERENTIAL; Future    4. Neck pain  Pain and stiffness in the am   meloxicam (MOBIC) 7.5 MG Tab; Take 1 Tablet by mouth every day.  Dispense: 30 Tablet; Refill: 3   Comp Metabolic Panel; Future   FREE THYROXINE; Future   TRIIDOTHYRONINE; Future   Lipid Profile; Future   CBC WITHOUT DIFFERENTIAL; Future    Past Medical History:  05/2019: Aortic stenosis      Comment:  Echocardiogram with normal LV size, mild concentric LVH,               LVEF 65%. Critical AS (NEWTON 0.5cm2, Vmax 4.5m/s, peak                81mmHg, mean 60mmHg), mild TR. RVSP 35mmHg.  No date: Bilateral cataracts  No date: Cataract  No date: Heart murmur  No date: Hypertension  No date: Ischemic heart disease  No date: Multiple thyroid nodules      Comment:  reportedly had thyroid us in [place] 4/12 that was                stable.   No date:  Osteoporosis  Past Surgical History:  No date: ABDOMINAL HYSTERECTOMY TOTAL  Social History    Tobacco Use      Smoking status: Never Smoker      Smokeless tobacco: Never Used    Vaping Use      Vaping Use: Never used    Alcohol use: No    Drug use: No    Review of patient's family history indicates:  Problem: Cancer      Relation: Daughter          Age of Onset: (Not Specified)          Comment: breast cancer   Problem: Genetic Disorder      Relation: Maternal Grandmother          Age of Onset: (Not Specified)  Problem: Psychiatric Illness      Relation: Daughter          Age of Onset: (Not Specified)      Current Outpatient Medications: •  triamcinolone acetonide (KENALOG) 0.1 % Ointment, Apply 1 g topically 2 times a day., Disp: 80 g, Rfl: 4•  meloxicam (MOBIC) 7.5 MG Tab, Take 1 Tablet by mouth every day., Disp: 30 Tablet, Rfl: 3•  lisinopril-hydrochlorothiazide (PRINZIDE) 20-12.5 MG per tablet, TAKE ONE TABLET BY MOUTH EVERY DAY, Disp: 90 Tablet, Rfl: 1•  amLODIPine (NORVASC) 2.5 MG Tab, TAKE ONE TABLET BY MOUTH EVERY DAY, Disp: 90 Tablet, Rfl: 3•  carvedilol (COREG) 6.25 MG Tab, TAKE ONE TABLET BY MOUTH TWICE A DAY WITH MEALS, Disp: 180 Tablet, Rfl: 3•  hydrocortisone 2.5 % Cream topical cream, Apply sparingly to affected area bid, Disp: 1 Tube, Rfl: 0•  aspirin EC (ECOTRIN) 81 MG TBEC, Take 81 mg by mouth every day., Disp: , Rfl:   Current Facility-Administered Medications: •  triamcinolone acetonide (KENALOG-40) injection 40 mg, 40 mg, Intramuscular, Once, Baltazar Hopkins M.D.    Patient was instructed on the use of medications, either prescriptions or OTC and informed on when the appropriate follow up time period should be. In addition, patient was also instructed that should any acute worsening occur that they should notify this clinic asap or call 911.          Review of Systems   Constitutional: Negative.  Negative for chills and fever.   HENT: Negative.  Negative for hearing loss.    Eyes: Negative.   "Negative for blurred vision and double vision.   Respiratory: Negative.  Negative for cough and hemoptysis.    Cardiovascular: Negative.  Negative for chest pain and palpitations.   Gastrointestinal: Negative.  Negative for heartburn and nausea.   Genitourinary: Negative.  Negative for dysuria.   Musculoskeletal: Positive for neck pain. Negative for myalgias.   Skin: Positive for itching and rash.   Neurological: Negative.  Negative for dizziness, tingling and headaches.   Endo/Heme/Allergies: Negative.  Does not bruise/bleed easily.   Psychiatric/Behavioral: Negative.  Negative for depression and suicidal ideas.   All other systems reviewed and are negative.             Objective     /78 (BP Location: Right arm, Patient Position: Sitting, BP Cuff Size: Adult)   Pulse 62   Temp 37.2 °C (99 °F) (Temporal)   Resp 12   Ht 1.626 m (5' 4\")   Wt 51.2 kg (112 lb 12.8 oz)   SpO2 97%   Breastfeeding No   BMI 19.36 kg/m²      Physical Exam  Vitals and nursing note reviewed.   Constitutional:       General: She is not in acute distress.     Appearance: She is well-developed. She is not diaphoretic.   HENT:      Head: Normocephalic and atraumatic.      Mouth/Throat:      Pharynx: No oropharyngeal exudate.   Eyes:      Pupils: Pupils are equal, round, and reactive to light.   Cardiovascular:      Rate and Rhythm: Normal rate and regular rhythm.      Heart sounds: Normal heart sounds. No murmur heard.    No friction rub. No gallop.   Pulmonary:      Effort: Pulmonary effort is normal. No respiratory distress.      Breath sounds: Normal breath sounds. No wheezing or rales.   Chest:      Chest wall: No tenderness.   Skin:     Findings: Erythema present.          Neurological:      Mental Status: She is alert and oriented to person, place, and time.   Psychiatric:         Behavior: Behavior normal.         Thought Content: Thought content normal.         Judgment: Judgment normal.                             Assessment " & Plan        1. Dermatitis    - triamcinolone acetonide (KENALOG-40) injection 40 mg  - triamcinolone acetonide (KENALOG) 0.1 % Ointment; Apply 1 g topically 2 times a day.  Dispense: 80 g; Refill: 4  - Comp Metabolic Panel; Future  - FREE THYROXINE; Future  - TRIIDOTHYRONINE; Future  - Lipid Profile; Future  - CBC WITHOUT DIFFERENTIAL; Future    2. Nonrheumatic aortic valve stenosis    - Comp Metabolic Panel; Future  - FREE THYROXINE; Future  - TRIIDOTHYRONINE; Future  - Lipid Profile; Future  - CBC WITHOUT DIFFERENTIAL; Future    3. Essential hypertension    - Comp Metabolic Panel; Future  - FREE THYROXINE; Future  - TRIIDOTHYRONINE; Future  - Lipid Profile; Future  - CBC WITHOUT DIFFERENTIAL; Future    4. Neck pain  - meloxicam (MOBIC) 7.5 MG Tab; Take 1 Tablet by mouth every day.  Dispense: 30 Tablet; Refill: 3  - Comp Metabolic Panel; Future  - FREE THYROXINE; Future  - TRIIDOTHYRONINE; Future  - Lipid Profile; Future  - CBC WITHOUT DIFFERENTIAL; Future

## 2022-06-08 DIAGNOSIS — I10 ESSENTIAL HYPERTENSION: ICD-10-CM

## 2022-06-08 RX ORDER — LISINOPRIL AND HYDROCHLOROTHIAZIDE 20; 12.5 MG/1; MG/1
TABLET ORAL
Qty: 90 TABLET | Refills: 1 | Status: SHIPPED | OUTPATIENT
Start: 2022-06-08 | End: 2022-11-22 | Stop reason: SDUPTHER

## 2022-11-22 DIAGNOSIS — I10 ESSENTIAL HYPERTENSION: ICD-10-CM

## 2022-11-22 DIAGNOSIS — L30.9 DERMATITIS: ICD-10-CM

## 2022-11-22 DIAGNOSIS — M54.2 NECK PAIN: ICD-10-CM

## 2022-11-22 RX ORDER — TRIAMCINOLONE ACETONIDE 1 MG/G
1 OINTMENT TOPICAL 2 TIMES DAILY
Qty: 80 G | Refills: 4 | Status: SHIPPED | OUTPATIENT
Start: 2022-11-22

## 2022-11-22 RX ORDER — LISINOPRIL AND HYDROCHLOROTHIAZIDE 20; 12.5 MG/1; MG/1
1 TABLET ORAL
Qty: 90 TABLET | Refills: 1 | Status: SHIPPED | OUTPATIENT
Start: 2022-11-22

## 2022-11-22 RX ORDER — AMLODIPINE BESYLATE 2.5 MG/1
2.5 TABLET ORAL
Qty: 90 TABLET | Refills: 3 | Status: SHIPPED | OUTPATIENT
Start: 2022-11-22

## 2022-11-22 RX ORDER — CARVEDILOL 6.25 MG/1
6.25 TABLET ORAL 2 TIMES DAILY WITH MEALS
Qty: 180 TABLET | Refills: 3 | Status: SHIPPED | OUTPATIENT
Start: 2022-11-22

## 2022-11-22 NOTE — TELEPHONE ENCOUNTER
Received request via: Patient    Was the patient seen in the last year in this department? Yes    Does the patient have an active prescription (recently filled or refills available) for medication(s) requested? No    Does the patient have skilled nursing Plus and need 100 day supply (blood pressure, diabetes and cholesterol meds only)? Patient does not have SCP

## 2023-01-30 ENCOUNTER — TELEMEDICINE (OUTPATIENT)
Dept: MEDICAL GROUP | Facility: PHYSICIAN GROUP | Age: 88
End: 2023-01-30
Payer: COMMERCIAL

## 2023-01-30 ENCOUNTER — APPOINTMENT (OUTPATIENT)
Dept: MEDICAL GROUP | Facility: PHYSICIAN GROUP | Age: 88
End: 2023-01-30
Payer: COMMERCIAL

## 2023-01-30 VITALS — WEIGHT: 112 LBS | BODY MASS INDEX: 19.12 KG/M2 | HEIGHT: 64 IN

## 2023-01-30 DIAGNOSIS — G30.0 EARLY ONSET ALZHEIMER'S DEMENTIA WITHOUT BEHAVIORAL DISTURBANCE, PSYCHOTIC DISTURBANCE, MOOD DISTURBANCE, OR ANXIETY, UNSPECIFIED DEMENTIA SEVERITY (HCC): ICD-10-CM

## 2023-01-30 DIAGNOSIS — F02.80 EARLY ONSET ALZHEIMER'S DEMENTIA WITHOUT BEHAVIORAL DISTURBANCE, PSYCHOTIC DISTURBANCE, MOOD DISTURBANCE, OR ANXIETY, UNSPECIFIED DEMENTIA SEVERITY (HCC): ICD-10-CM

## 2023-01-30 PROCEDURE — 99214 OFFICE O/P EST MOD 30 MIN: CPT | Mod: 95 | Performed by: FAMILY MEDICINE

## 2023-01-30 RX ORDER — DONEPEZIL HYDROCHLORIDE 5 MG/1
5 TABLET, FILM COATED ORAL NIGHTLY
Qty: 30 TABLET | Refills: 3 | Status: SHIPPED | OUTPATIENT
Start: 2023-01-30

## 2023-01-30 NOTE — PROGRESS NOTES
Virtual Visit: Established Patient   This visit was conducted via Zoom using secure and encrypted videoconferencing technology.   The patient was in their home in the [facility].    The patient's identity was confirmed and verbal consent was obtained for this virtual visit.    Subjective:   CC:   Chief Complaint   Patient presents with    Follow-Up     Decline in health/concerns with mental state/periods of aggression and confusion     Tamy Calvin is a 87 y.o. female presenting for evaluation and management of:    1. Early onset Alzheimer's dementia without behavioral disturbance, psychotic disturbance, mood disturbance, or anxiety, unspecified dementia severity (HCC)  Lives with adult daughter who has noticed memory loss, some confusion and irritability and some confusion. Mmse hard due to language barrier but with  obvious deficits present. They agree to start meds and f/u in 4 weeks for efficacy    - donepezil (ARICEPT) 5 MG Tab; Take 1 Tablet by mouth every evening.  Dispense: 30 Tablet; Refill: 3    Past Medical History:   Diagnosis Date    Aortic stenosis 05/2019    Echocardiogram with normal LV size, mild concentric LVH, LVEF 65%. Critical AS (NEWTON 0.5cm2, Vmax 4.5m/s, peak 81mmHg, mean 60mmHg), mild TR. RVSP 35mmHg.    Bilateral cataracts     Cataract     Heart murmur     Hypertension     Ischemic heart disease     Multiple thyroid nodules     reportedly had thyroid us in [place] 4/12 that was stable.     Osteoporosis      Past Surgical History:   Procedure Laterality Date    ABDOMINAL HYSTERECTOMY TOTAL         Social History     Tobacco Use    Smoking status: Never    Smokeless tobacco: Never   Vaping Use    Vaping Use: Never used   Substance Use Topics    Alcohol use: No    Drug use: No       Family History   Problem Relation Age of Onset    Cancer Daughter         breast cancer     Genetic Disorder Maternal Grandmother     Psychiatric Illness Daughter          Current Outpatient  Medications:     donepezil (ARICEPT) 5 MG Tab, Take 1 Tablet by mouth every evening., Disp: 30 Tablet, Rfl: 3    triamcinolone acetonide (KENALOG) 0.1 % Ointment, Apply 1 g topically 2 times a day., Disp: 80 g, Rfl: 4    lisinopril-hydrochlorothiazide (PRINZIDE) 20-12.5 MG per tablet, Take 1 Tablet by mouth every day., Disp: 90 Tablet, Rfl: 1    carvedilol (COREG) 6.25 MG Tab, Take 1 Tablet by mouth 2 times a day with meals., Disp: 180 Tablet, Rfl: 3    amLODIPine (NORVASC) 2.5 MG Tab, Take 1 Tablet by mouth every day., Disp: 90 Tablet, Rfl: 3    meloxicam (MOBIC) 7.5 MG Tab, Take 1 Tablet by mouth every day., Disp: 30 Tablet, Rfl: 3    hydrocortisone 2.5 % Cream topical cream, Apply sparingly to affected area bid, Disp: 1 Tube, Rfl: 0    aspirin EC (ECOTRIN) 81 MG TBEC, Take 81 mg by mouth every day., Disp: , Rfl:     Patient was instructed on the use of medications, either prescriptions or OTC and informed on when the appropriate follow up time period should be. In addition, patient was also instructed that should any acute worsening occur that they should notify this clinic asap or call 911.        ROS       Current medicines (including changes today)  Current Outpatient Medications   Medication Sig Dispense Refill    donepezil (ARICEPT) 5 MG Tab Take 1 Tablet by mouth every evening. 30 Tablet 3    triamcinolone acetonide (KENALOG) 0.1 % Ointment Apply 1 g topically 2 times a day. 80 g 4    lisinopril-hydrochlorothiazide (PRINZIDE) 20-12.5 MG per tablet Take 1 Tablet by mouth every day. 90 Tablet 1    carvedilol (COREG) 6.25 MG Tab Take 1 Tablet by mouth 2 times a day with meals. 180 Tablet 3    amLODIPine (NORVASC) 2.5 MG Tab Take 1 Tablet by mouth every day. 90 Tablet 3    meloxicam (MOBIC) 7.5 MG Tab Take 1 Tablet by mouth every day. 30 Tablet 3    hydrocortisone 2.5 % Cream topical cream Apply sparingly to affected area bid 1 Tube 0    aspirin EC (ECOTRIN) 81 MG TBEC Take 81 mg by mouth every day.       No  "current facility-administered medications for this visit.       Patient Active Problem List    Diagnosis Date Noted    Encounter for screening and preventative care 08/11/2017    Colon polyps 01/29/2015    Right foot pain 08/25/2014    Seasonal allergies 06/11/2013    Aortic stenosis 08/07/2012    Hypertension 06/25/2012    Preventative health care 06/25/2012    Osteoporosis 06/25/2012    Ischemic heart disease     Multiple thyroid nodules         Objective:   Ht 1.626 m (5' 4\")   Wt 50.8 kg (112 lb)   BMI 19.22 kg/m²     Physical Exam:  Constitutional: Alert, no distress, well-groomed.  Skin: No rashes in visible areas.  Eye: Round. Conjunctiva clear, lids normal. No icterus.   ENMT: Lips pink without lesions, good dentition, moist mucous membranes. Phonation normal.  Neck: No masses, no thyromegaly. Moves freely without pain.  Respiratory: Unlabored respiratory effort, no cough or audible wheeze  Psych: Alert and oriented x3, normal affect and mood.     Assessment and Plan:   The following treatment plan was discussed:     1. Early onset Alzheimer's dementia without behavioral disturbance, psychotic disturbance, mood disturbance, or anxiety, unspecified dementia severity (HCC)  - donepezil (ARICEPT) 5 MG Tab; Take 1 Tablet by mouth every evening.  Dispense: 30 Tablet; Refill: 3      Follow-up: No follow-ups on file.         "

## 2023-02-06 ENCOUNTER — TELEPHONE (OUTPATIENT)
Dept: MEDICAL GROUP | Facility: PHYSICIAN GROUP | Age: 88
End: 2023-02-06
Payer: COMMERCIAL

## 2023-02-06 DIAGNOSIS — F02.80 EARLY ONSET ALZHEIMER'S DEMENTIA WITHOUT BEHAVIORAL DISTURBANCE, PSYCHOTIC DISTURBANCE, MOOD DISTURBANCE, OR ANXIETY, UNSPECIFIED DEMENTIA SEVERITY (HCC): ICD-10-CM

## 2023-02-06 DIAGNOSIS — G30.0 EARLY ONSET ALZHEIMER'S DEMENTIA WITHOUT BEHAVIORAL DISTURBANCE, PSYCHOTIC DISTURBANCE, MOOD DISTURBANCE, OR ANXIETY, UNSPECIFIED DEMENTIA SEVERITY (HCC): ICD-10-CM

## 2023-02-06 RX ORDER — DONEPEZIL HYDROCHLORIDE 10 MG/1
10 TABLET, FILM COATED ORAL NIGHTLY
Qty: 30 TABLET | Refills: 3 | Status: SHIPPED | OUTPATIENT
Start: 2023-02-06

## 2023-02-06 NOTE — TELEPHONE ENCOUNTER
Patients daughter states you were going to send in a new prescription for Donepezil 10 MG due to pharmacy not having the 5 MG in [facility]. Please advise and send to [facility] here in [place]

## 2023-02-16 ENCOUNTER — TELEPHONE (OUTPATIENT)
Dept: MEDICAL GROUP | Facility: PHYSICIAN GROUP | Age: 88
End: 2023-02-16
Payer: COMMERCIAL

## 2023-02-16 RX ORDER — HALOPERIDOL 2 MG/1
2 TABLET ORAL 3 TIMES DAILY
Qty: 90 TABLET | Refills: 1 | Status: SHIPPED | OUTPATIENT
Start: 2023-02-16

## 2023-02-16 NOTE — TELEPHONE ENCOUNTER
Called pt daughter to let her know new medication for pt has been sent to [facility] in [place].

## 2023-03-01 ENCOUNTER — TELEPHONE (OUTPATIENT)
Dept: MEDICAL GROUP | Facility: PHYSICIAN GROUP | Age: 88
End: 2023-03-01
Payer: COMMERCIAL

## 2023-03-01 NOTE — TELEPHONE ENCOUNTER
Patient's daughter called office in regards to patient. She had sent a [employer] message through her [employer] in regards to her mother. She states she is very lethargic, and she stopped swallowing and she wants to sleep all the time. She states she is considering the Hospice route and she called Hospice this morning and spoke to a nurse. The nurse stated to not force patient to eat or drink because that is the last thing that want is to force the patient. I offered an earlier appt with another provider but she refused. She wanted to wait until her mom appt on 3/7/2023. She wanted to know if her mom needs to go to the hospital.